# Patient Record
Sex: FEMALE | Race: WHITE | HISPANIC OR LATINO | URBAN - METROPOLITAN AREA
[De-identification: names, ages, dates, MRNs, and addresses within clinical notes are randomized per-mention and may not be internally consistent; named-entity substitution may affect disease eponyms.]

---

## 2017-01-07 ENCOUNTER — EMERGENCY (EMERGENCY)
Facility: HOSPITAL | Age: 27
LOS: 0 days | Discharge: HOME | End: 2017-01-07
Admitting: EMERGENCY MEDICINE
Payer: SELF-PAY

## 2017-01-07 DIAGNOSIS — T19.2XXA FOREIGN BODY IN VULVA AND VAGINA, INITIAL ENCOUNTER: ICD-10-CM
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
## 2017-01-07 PROCEDURE — 99283 EMERGENCY DEPT VISIT LOW MDM: CPT

## 2018-01-01 ENCOUNTER — OUTPATIENT (OUTPATIENT)
Dept: OUTPATIENT SERVICES | Facility: HOSPITAL | Age: 28
LOS: 1 days | End: 2018-01-01
Payer: MEDICAID

## 2018-01-01 PROCEDURE — G9001: CPT

## 2018-01-16 ENCOUNTER — EMERGENCY (EMERGENCY)
Facility: HOSPITAL | Age: 28
LOS: 0 days | Discharge: HOME | End: 2018-01-16
Attending: EMERGENCY MEDICINE | Admitting: EMERGENCY MEDICINE
Payer: MEDICAID

## 2018-01-16 VITALS
OXYGEN SATURATION: 100 % | HEART RATE: 81 BPM | TEMPERATURE: 98 F | DIASTOLIC BLOOD PRESSURE: 70 MMHG | SYSTOLIC BLOOD PRESSURE: 116 MMHG | RESPIRATION RATE: 19 BRPM

## 2018-01-16 VITALS — HEIGHT: 61 IN | WEIGHT: 145.06 LBS

## 2018-01-16 DIAGNOSIS — R07.0 PAIN IN THROAT: ICD-10-CM

## 2018-01-16 DIAGNOSIS — R09.81 NASAL CONGESTION: ICD-10-CM

## 2018-01-16 DIAGNOSIS — R05 COUGH: ICD-10-CM

## 2018-01-16 DIAGNOSIS — J06.9 ACUTE UPPER RESPIRATORY INFECTION, UNSPECIFIED: ICD-10-CM

## 2018-01-16 PROCEDURE — 99283 EMERGENCY DEPT VISIT LOW MDM: CPT

## 2018-01-16 RX ORDER — DEXAMETHASONE 0.5 MG/5ML
8 ELIXIR ORAL ONCE
Qty: 0 | Refills: 0 | Status: DISCONTINUED | OUTPATIENT
Start: 2018-01-16 | End: 2018-01-16

## 2018-01-16 RX ORDER — IBUPROFEN 200 MG
600 TABLET ORAL ONCE
Qty: 0 | Refills: 0 | Status: COMPLETED | OUTPATIENT
Start: 2018-01-16 | End: 2018-01-16

## 2018-01-16 RX ORDER — DEXAMETHASONE 0.5 MG/5ML
10 ELIXIR ORAL ONCE
Qty: 0 | Refills: 0 | Status: COMPLETED | OUTPATIENT
Start: 2018-01-16 | End: 2018-01-16

## 2018-01-16 RX ADMIN — Medication 600 MILLIGRAM(S): at 17:27

## 2018-01-16 RX ADMIN — Medication 10 MILLIGRAM(S): at 17:27

## 2018-01-16 NOTE — ED STATDOCS - OBJECTIVE STATEMENT
26 y/o F w/ pshx of tonsillectomy presents to ED c/o nasal congestion x4 days ago w/ dry cough at night +chills, +sweats, +muscle aches. Pt c/o swollen glands on her neck and nasal congestion and some difficulty breathing.

## 2018-01-16 NOTE — ED ADULT NURSE NOTE - OBJECTIVE STATEMENT
pt presents to ED with dry cough and nasal congestion x few days. pt c.o chills. afebrile. denies n/v/d. will continue to monitor and reassess

## 2018-01-16 NOTE — ED PROVIDER NOTE - ASSOCIATED SYMPTOMS
26 y/o Female reports to ED c/o nasal congestion x 4 days, dry cough for 2 days, worse at night.  Felt chills and sweats few days ago.  did not take temperature.

## 2018-01-16 NOTE — ED STATDOCS - NS_ ATTENDINGSCRIBEDETAILS _ED_A_ED_FT
Everette Doran DO (Attending): The history, relevant review of systems, past medical and surgical history, medical decision making, and physical examination was documented by the scribe in my presence and I attest to the accuracy of the documentation.

## 2018-01-16 NOTE — ED STATDOCS - ATTENDING CONTRIBUTION TO CARE
I, Everette Doran DO,  performed the initial face to face bedside interview with this patient regarding history of present illness, review of symptoms and relevant past medical, social and family history.  I completed an independent physical examination.  I was the initial provider who evaluated this patient. I have signed out the follow up of any pending tests (i.e. labs, radiological studies) to the ACP.  I have communicated the patient’s plan of care and disposition with the ACP.

## 2018-01-16 NOTE — ED STATDOCS - ENMT, MLM
nasal congestion, post-nasal drip. B/l TMs w/ effusion, no erythema. Mouth with normal mucosa  Throat has no vesicles, no oropharyngeal exudates and uvula is midline.

## 2018-01-18 DIAGNOSIS — R69 ILLNESS, UNSPECIFIED: ICD-10-CM

## 2018-05-04 ENCOUNTER — APPOINTMENT (OUTPATIENT)
Dept: OPHTHALMOLOGY | Facility: CLINIC | Age: 28
End: 2018-05-04
Payer: MEDICAID

## 2018-05-04 PROCEDURE — 99204 OFFICE O/P NEW MOD 45 MIN: CPT

## 2018-05-17 ENCOUNTER — APPOINTMENT (OUTPATIENT)
Dept: OPHTHALMOLOGY | Facility: CLINIC | Age: 28
End: 2018-05-17

## 2018-05-24 LAB
BACTERIA EYE AEROBE CULT: NORMAL
VIRAL CULTURE, GENERAL: NORMAL

## 2018-06-07 ENCOUNTER — EMERGENCY (EMERGENCY)
Facility: HOSPITAL | Age: 28
LOS: 0 days | Discharge: HOME | End: 2018-06-08
Attending: EMERGENCY MEDICINE | Admitting: EMERGENCY MEDICINE
Payer: MEDICAID

## 2018-06-07 VITALS
RESPIRATION RATE: 18 BRPM | HEART RATE: 75 BPM | WEIGHT: 139.99 LBS | HEIGHT: 61 IN | DIASTOLIC BLOOD PRESSURE: 77 MMHG | OXYGEN SATURATION: 100 % | TEMPERATURE: 98 F | SYSTOLIC BLOOD PRESSURE: 117 MMHG

## 2018-06-07 DIAGNOSIS — S93.602A UNSPECIFIED SPRAIN OF LEFT FOOT, INITIAL ENCOUNTER: ICD-10-CM
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
## 2018-06-07 DIAGNOSIS — Z91.040 LATEX ALLERGY STATUS: ICD-10-CM

## 2018-06-07 DIAGNOSIS — M79.672 PAIN IN LEFT FOOT: ICD-10-CM

## 2018-06-07 PROCEDURE — 99283 EMERGENCY DEPT VISIT LOW MDM: CPT | Mod: 25

## 2018-06-07 NOTE — ED ADULT TRIAGE NOTE - CHIEF COMPLAINT QUOTE
left big toe pain s/p fall last week. Pt tripped and fell wearing heels and injured left big toe. Worsening pain today. No medication taken at home for pain.

## 2018-06-08 PROCEDURE — 73630 X-RAY EXAM OF FOOT: CPT | Mod: 26,LT

## 2018-06-08 RX ORDER — IBUPROFEN 200 MG
800 TABLET ORAL ONCE
Qty: 0 | Refills: 0 | Status: COMPLETED | OUTPATIENT
Start: 2018-06-08 | End: 2018-06-08

## 2018-06-08 RX ADMIN — Medication 800 MILLIGRAM(S): at 02:07

## 2018-06-08 NOTE — ED PROVIDER NOTE - OBJECTIVE STATEMENT
29 y/o otherwise healthy female p/w right foot 27 y/o otherwise healthy female p/w left medial foot pain worse at the 1st toe over the past 3 weeks.  She initially inverted her foot while wearing heals 3 weeks ago.  She has been wearing flat shoes but still having pain.  She had a massage during a pedicure today and felt a "pop" at her 1st MTP joint.  She hasn't taken any meds for pain.

## 2018-06-12 LAB — FUNGUS SPEC CULT ORG #8: NORMAL

## 2018-06-26 ENCOUNTER — APPOINTMENT (OUTPATIENT)
Dept: OPHTHALMOLOGY | Facility: CLINIC | Age: 28
End: 2018-06-26
Payer: MEDICAID

## 2018-07-05 ENCOUNTER — APPOINTMENT (OUTPATIENT)
Dept: OPHTHALMOLOGY | Facility: CLINIC | Age: 28
End: 2018-07-05
Payer: MEDICAID

## 2018-07-05 PROCEDURE — 92012 INTRM OPH EXAM EST PATIENT: CPT

## 2018-07-12 ENCOUNTER — APPOINTMENT (OUTPATIENT)
Dept: OPHTHALMOLOGY | Facility: CLINIC | Age: 28
End: 2018-07-12
Payer: MEDICAID

## 2018-07-12 DIAGNOSIS — H10.32 UNSPECIFIED ACUTE CONJUNCTIVITIS, LEFT EYE: ICD-10-CM

## 2018-07-12 PROCEDURE — 92012 INTRM OPH EXAM EST PATIENT: CPT

## 2018-07-19 ENCOUNTER — APPOINTMENT (OUTPATIENT)
Dept: OPHTHALMOLOGY | Facility: CLINIC | Age: 28
End: 2018-07-19

## 2018-07-19 DIAGNOSIS — H10.412: ICD-10-CM

## 2021-02-19 ENCOUNTER — EMERGENCY (EMERGENCY)
Facility: HOSPITAL | Age: 31
LOS: 0 days | Discharge: HOME | End: 2021-02-19
Attending: EMERGENCY MEDICINE
Payer: MEDICAID

## 2021-02-19 VITALS
HEART RATE: 82 BPM | DIASTOLIC BLOOD PRESSURE: 84 MMHG | RESPIRATION RATE: 13 BRPM | TEMPERATURE: 99 F | SYSTOLIC BLOOD PRESSURE: 127 MMHG | OXYGEN SATURATION: 100 %

## 2021-02-19 VITALS — WEIGHT: 154.98 LBS | HEIGHT: 61 IN

## 2021-02-19 DIAGNOSIS — M54.31 SCIATICA, RIGHT SIDE: ICD-10-CM

## 2021-02-19 DIAGNOSIS — M54.9 DORSALGIA, UNSPECIFIED: ICD-10-CM

## 2021-02-19 DIAGNOSIS — Z91.040 LATEX ALLERGY STATUS: ICD-10-CM

## 2021-02-19 PROCEDURE — 99283 EMERGENCY DEPT VISIT LOW MDM: CPT | Mod: 25

## 2021-02-19 PROCEDURE — 81025 URINE PREGNANCY TEST: CPT

## 2021-02-19 PROCEDURE — 96372 THER/PROPH/DIAG INJ SC/IM: CPT

## 2021-02-19 PROCEDURE — 99283 EMERGENCY DEPT VISIT LOW MDM: CPT

## 2021-02-19 RX ORDER — KETOROLAC TROMETHAMINE 30 MG/ML
30 SYRINGE (ML) INJECTION ONCE
Refills: 0 | Status: DISCONTINUED | OUTPATIENT
Start: 2021-02-19 | End: 2021-02-19

## 2021-02-19 RX ORDER — IBUPROFEN 200 MG
1 TABLET ORAL
Qty: 20 | Refills: 0
Start: 2021-02-19 | End: 2021-02-23

## 2021-02-19 RX ORDER — CYCLOBENZAPRINE HYDROCHLORIDE 10 MG/1
1 TABLET, FILM COATED ORAL
Qty: 15 | Refills: 0
Start: 2021-02-19 | End: 2021-02-23

## 2021-02-19 RX ADMIN — Medication 30 MILLIGRAM(S): at 20:33

## 2021-02-19 NOTE — ED STATDOCS - NS_EDPROVIDERDISPOUSERTYPE_ED_A_ED
----- Message from Maria G Gonzalez sent at 1/24/2019  1:02 PM CST -----  Contact: Patient  Type:  Sooner Apoointment Request    Caller is requesting a sooner appointment.  Caller declined first available appointment listed below.  Caller will not accept being placed on the waitlist and is requesting a message be sent to doctor.    Name of Caller:  Patient  When is the first available appointment?  Patient is trying to schedule an appt on 2/13-2/15 because she will be in town then  Symptoms:  Medication check  Best Call Back Number:    Additional Information:  Patient would also like to find out if she needs labs done before her appt.       Scribe Attestation (For Scribes USE Only)... Attending Attestation (For Attendings USE Only).../Scribe Attestation (For Scribes USE Only)...

## 2021-02-19 NOTE — ED STATDOCS - PATIENT PORTAL LINK FT
You can access the FollowMyHealth Patient Portal offered by [facility] by registering at the following website: [email or web address]. By joining [employer]’s FollowMyHealth portal, you will also be able to view your health information using other applications (apps) compatible with our system.

## 2021-02-19 NOTE — ED STATDOCS - CARE PROVIDER_API CALL
Ulisses Jefferson (DO)  Orthopaedic Surgery  [address] [place] [address], [address] [place] [address]  Phone: [phone]  Fax: [phone]  Follow Up Time:

## 2021-02-19 NOTE — ED ADULT TRIAGE NOTE - CHIEF COMPLAINT QUOTE
presents to the ed complaining of lower back pan radiating down the R side of her leg, onset began when patient woke up 2 weeks ago.

## 2021-02-19 NOTE — ED STATDOCS - NSFOLLOWUPINSTRUCTIONS_ED_ALL_ED_FT
Sciatica       Sciatica is pain, weakness, tingling, or loss of feeling (numbness) along the sciatic nerve. The sciatic nerve starts in the lower back and goes down the back of each leg. Sciatica usually goes away on its own or with treatment. Sometimes, sciatica may come back (recur).      What are the causes?  This condition happens when the sciatic nerve is pinched or has pressure put on it. This may be the result of:  •A disk in between the bones of the spine bulging out too far (herniated disk).      •Changes in the spinal disks that occur with aging.      •A condition that affects a muscle in the butt.      •Extra bone growth near the sciatic nerve.      •A break (fracture) of the area between your hip bones (pelvis).      •Pregnancy.       •Tumor. This is rare.        What increases the risk?  You are more likely to develop this condition if you:  •Play sports that put pressure or stress on the spine.      •Have poor strength and ease of movement (flexibility).      •Have had a back injury in the past.      •Have had back surgery.      •Sit for long periods of time.      •Do activities that involve bending or lifting over and over again.      •Are very overweight (obese).        What are the signs or symptoms?  Symptoms can vary from mild to very bad. They may include:•Any of these problems in the lower back, leg, hip, or butt:  •Mild tingling, loss of feeling, or dull aches.      •Burning sensations.      •Sharp pains.         •Loss of feeling in the back of the calf or the sole of the foot.      •Leg weakness.       •Very bad back pain that makes it hard to move.      These symptoms may get worse when you cough, sneeze, or laugh. They may also get worse when you sit or stand for long periods of time.      How is this treated?  This condition often gets better without any treatment. However, treatment may include:  •Changing or cutting back on physical activity when you have pain.      •Doing exercises and stretching.      •Putting ice or heat on the affected area.    •Medicines that help:   •To relieve pain and swelling.       •To relax your muscles.         •Shots (injections) of medicines that help to relieve pain, irritation, and swelling.      •Surgery.        Follow these instructions at home:    Medicines     •Take over-the-counter and prescription medicines only as told by your doctor.    •Ask your doctor if the medicine prescribed to you:  •Requires you to avoid driving or using heavy machinery.    •Can cause trouble pooping (constipation). You may need to take these steps to prevent or treat trouble pooping:  •Drink enough fluids to keep your pee (urine) pale yellow.      •Take over-the-counter or prescription medicines.      •Eat foods that are high in fiber. These include beans, whole grains, and fresh fruits and vegetables.      •Limit foods that are high in fat and sugar. These include fried or sweet foods.            Managing pain                 •If told, put ice on the affected area.  •Put ice in a plastic bag.      •Place a towel between your skin and the bag.      •Leave the ice on for 20 minutes, 2–3 times a day.      •If told, put heat on the affected area. Use the heat source that your doctor tells you to use, such as a moist heat pack or a heating pad.  •Place a towel between your skin and the heat source.      •Leave the heat on for 20–30 minutes.      •Remove the heat if your skin turns bright red. This is very important if you are unable to feel pain, heat, or cold. You may have a greater risk of getting burned.          Activity      •Return to your normal activities as told by your doctor. Ask your doctor what activities are safe for you.      •Avoid activities that make your symptoms worse.    •Take short rests during the day.  •When you rest for a long time, do some physical activity or stretching between periods of rest.      •Avoid sitting for a long time without moving. Get up and move around at least one time each hour.        •Exercise and stretch regularly, as told by your doctor.    • Do not lift anything that is heavier than 10 lb (4.5 kg) while you have symptoms of sciatica.  •Avoid lifting heavy things even when you do not have symptoms.      •Avoid lifting heavy things over and over.      •When you lift objects, always lift in a way that is safe for your body. To do this, you should:  •Bend your knees.      •Keep the object close to your body.      •Avoid twisting.        General instructions     •Stay at a healthy weight.      •Wear comfortable shoes that support your feet. Avoid wearing high heels.      •Avoid sleeping on a mattress that is too soft or too hard. You might have less pain if you sleep on a mattress that is firm enough to support your back.      •Keep all follow-up visits as told by your doctor. This is important.        Contact a doctor if:  •You have pain that:  •Wakes you up when you are sleeping.      •Gets worse when you lie down.      •Is worse than the pain you have had in the past.      •Lasts longer than 4 weeks.        •You lose weight without trying.        Get help right away if:    •You cannot control when you pee (urinate) or poop (have a bowel movement).    •You have weakness in any of these areas and it gets worse:  •Lower back.      •The area between your hip bones.      •Butt.      •Legs.        •You have redness or swelling of your back.      •You have a burning feeling when you pee.        Summary    •Sciatica is pain, weakness, tingling, or loss of feeling (numbness) along the sciatic nerve.      •This condition happens when the sciatic nerve is pinched or has pressure put on it.      •Sciatica can cause pain, tingling, or loss of feeling (numbness) in the lower back, legs, hips, and butt.      •Treatment often includes rest, exercise, medicines, and putting ice or heat on the affected area.      This information is not intended to replace advice given to you by your health care provider. Make sure you discuss any questions you have with your health care provider.      Document Revised: 01/06/2020 Document Reviewed: 01/06/2020    Elsevier Patient Education © 2021 [employer]

## 2021-02-19 NOTE — ED STATDOCS - OBJECTIVE STATEMENT
31 y/o female with no pertinent PMHx presents to the ED c/o back pain x 2.5 weeks. Pt states that pain began after cleaning home, worsening over past 4-5 days. Denies any precipitating trauma or injury. Pt describes pain as burning and radiates down to RLE. Denies bladder or bowel incontinence, dysuria, hematuria, fevers, chills, headache, chest pain, abd pain, SOB. No other complaints at this time.

## 2021-02-19 NOTE — ED STATDOCS - CLINICAL SUMMARY MEDICAL DECISION MAKING FREE TEXT BOX
Pt with sciatica.  No red flags, able to ambulate well, no neuro deficit.  D/c home with supportive care, f/u with spine.

## 2021-02-19 NOTE — ED STATDOCS - PROGRESS NOTE DETAILS
31 y/o female with no pertinent PMHx presents to the ED c/o back pain x 2.5 weeks. Pt states that pain began after cleaning home, worsening over past 4-5 days. Denies any precipitating trauma or injury. Pt describes pain as burning and radiates down to RLE. Denies bladder or bowel incontinence.  UCG pending.  Then pain meds will be given, Toradol and Flexeril.  Plan to dc home with Ortho Spine f/u.  Brittany Sotomayor PA-C

## 2021-03-04 ENCOUNTER — APPOINTMENT (OUTPATIENT)
Dept: NEUROSURGERY | Facility: CLINIC | Age: 31
End: 2021-03-04
Payer: MEDICAID

## 2021-03-04 ENCOUNTER — APPOINTMENT (OUTPATIENT)
Dept: RADIOLOGY | Facility: CLINIC | Age: 31
End: 2021-03-04
Payer: MEDICAID

## 2021-03-04 ENCOUNTER — OUTPATIENT (OUTPATIENT)
Dept: OUTPATIENT SERVICES | Facility: HOSPITAL | Age: 31
LOS: 1 days | End: 2021-03-04
Payer: MEDICAID

## 2021-03-04 VITALS
BODY MASS INDEX: 28.32 KG/M2 | WEIGHT: 150 LBS | TEMPERATURE: 97.7 F | SYSTOLIC BLOOD PRESSURE: 122 MMHG | DIASTOLIC BLOOD PRESSURE: 81 MMHG | HEART RATE: 98 BPM | HEIGHT: 61 IN | OXYGEN SATURATION: 98 %

## 2021-03-04 DIAGNOSIS — M54.40 LUMBAGO WITH SCIATICA, UNSPECIFIED SIDE: ICD-10-CM

## 2021-03-04 DIAGNOSIS — Z78.9 OTHER SPECIFIED HEALTH STATUS: ICD-10-CM

## 2021-03-04 PROCEDURE — 72110 X-RAY EXAM L-2 SPINE 4/>VWS: CPT | Mod: 26

## 2021-03-04 PROCEDURE — 99203 OFFICE O/P NEW LOW 30 MIN: CPT

## 2021-03-04 PROCEDURE — 99072 ADDL SUPL MATRL&STAF TM PHE: CPT

## 2021-03-04 PROCEDURE — 72110 X-RAY EXAM L-2 SPINE 4/>VWS: CPT

## 2021-03-04 RX ORDER — OFLOXACIN 3 MG/ML
0.3 SOLUTION/ DROPS OPHTHALMIC 4 TIMES DAILY
Qty: 5 | Refills: 1 | Status: DISCONTINUED | COMMUNITY
Start: 2018-07-12 | End: 2021-03-04

## 2021-03-04 RX ORDER — PREDNISOLONE ACETATE 10 MG/ML
1 SUSPENSION/ DROPS OPHTHALMIC 3 TIMES DAILY
Qty: 1 | Refills: 5 | Status: DISCONTINUED | COMMUNITY
Start: 2018-05-04 | End: 2021-03-04

## 2021-03-04 RX ORDER — OLOPATADINE HCL 1 MG/ML
0.1 SOLUTION/ DROPS OPHTHALMIC TWICE DAILY
Qty: 1 | Refills: 3 | Status: DISCONTINUED | COMMUNITY
Start: 2018-05-04 | End: 2021-03-04

## 2021-03-04 RX ORDER — METHYLPREDNISOLONE 4 MG/1
4 TABLET ORAL
Qty: 1 | Refills: 1 | Status: ACTIVE | COMMUNITY
Start: 2021-03-04 | End: 1900-01-01

## 2021-03-04 RX ORDER — LOTEPREDNOL ETABONATE AND TOBRAMYCIN 5; 3 MG/ML; MG/ML
0.5-0.3 SUSPENSION/ DROPS OPHTHALMIC 4 TIMES DAILY
Qty: 1 | Refills: 1 | Status: DISCONTINUED | COMMUNITY
Start: 2018-07-05 | End: 2021-03-04

## 2021-03-04 RX ORDER — ACETAMINOPHEN 325 MG/1
TABLET, FILM COATED ORAL
Refills: 0 | Status: ACTIVE | COMMUNITY

## 2021-03-04 RX ORDER — FLUOROMETHOLONE 1 MG/ML
0.1 SOLUTION/ DROPS OPHTHALMIC 3 TIMES DAILY
Qty: 5 | Refills: 1 | Status: DISCONTINUED | COMMUNITY
Start: 2018-07-12 | End: 2021-03-04

## 2021-03-04 RX ORDER — IBUPROFEN 200 MG/1
TABLET, COATED ORAL
Refills: 0 | Status: ACTIVE | COMMUNITY

## 2021-03-05 NOTE — CONSULT LETTER
[Dear  ___] : Dear  [unfilled], [Courtesy Letter:] : I had the pleasure of seeing your patient, [unfilled], in my office today. [Sincerely,] : Sincerely, [FreeTextEntry2] : Kasie Adams MD\par [address]\[place] [address] [FreeTextEntry1] : Anant Rudolph is a 30-year-old female who presents today with lumbar symptoms.  Patient reports chronic history of lower back pain dating about 9 years ago after giving birth.  Patient reports most recent flareup started 5 weeks ago without any specific event however has worsened within the past 2 weeks.  Patient reports 10 out of 10 sharp lower back pain with shooting burning pain radiating into buttocks, right side greater than left.  She denies any leg weakness, numbness, or tingling.  She denies any bowel or bladder dysfunction or saddle anesthesia.  She denies any tripping over her feet.  She reports going up stairs is difficult.\par \par Patient has been under the care of her primary care physician since August 2020 for her lower back symptoms.  At that time she had underwent 6 weeks of physical therapy with temporary relief.  Patient reports heating pads provide her temporary relief.  Icy Hot provides temporary relief.  Patient states she was seen at [facility] emergency room on February 19, 2021. patient was prescribed a muscle relaxant which has helped her sleep at night.  Patient is unable to take Advil and Tylenol due to GI side effects.\par \par Patient is alert and oriented.  No distress noted.  Strength to bilateral lower extremities 5/5.  Reflexes to lower extremity present and equal.  Negative clonus.  Patient is walk without difficulties.\par \par I provided the patient with a prescription for an x-ray and MRI of the lumbar spine.  I have also provided the patient with a prescription for physical therapy.  I provided the patient with a prescription for Medrol Dosepak.  We will follow-up over the phone once imaging is available.  Patient is aware to call with any further questions or concerns or with any new or worsening symptoms.\par  [FreeTextEntry3] : Mercedes Bergeron, MSN, FNP-BC\par Nurse Practitioner\par Neurosurgery\par [address] [place] [address], [address]nd floor \[place] [address] \par Office: [phone] \par Fax: [phone]\par \par

## 2021-03-06 ENCOUNTER — TRANSCRIPTION ENCOUNTER (OUTPATIENT)
Age: 31
End: 2021-03-06

## 2021-03-08 RX ORDER — METHYLPREDNISOLONE 4 MG/1
4 TABLET ORAL
Qty: 1 | Refills: 1 | Status: ACTIVE | COMMUNITY
Start: 2021-03-08 | End: 1900-01-01

## 2021-03-15 ENCOUNTER — APPOINTMENT (OUTPATIENT)
Dept: MRI IMAGING | Facility: CLINIC | Age: 31
End: 2021-03-15
Payer: MEDICAID

## 2021-03-15 ENCOUNTER — OUTPATIENT (OUTPATIENT)
Dept: OUTPATIENT SERVICES | Facility: HOSPITAL | Age: 31
LOS: 1 days | End: 2021-03-15
Payer: MEDICAID

## 2021-03-15 DIAGNOSIS — M54.40 LUMBAGO WITH SCIATICA, UNSPECIFIED SIDE: ICD-10-CM

## 2021-03-15 PROCEDURE — 72148 MRI LUMBAR SPINE W/O DYE: CPT

## 2021-03-15 PROCEDURE — 72148 MRI LUMBAR SPINE W/O DYE: CPT | Mod: 26

## 2021-08-07 ENCOUNTER — EMERGENCY (EMERGENCY)
Facility: HOSPITAL | Age: 31
LOS: 0 days | Discharge: HOME | End: 2021-08-07
Attending: EMERGENCY MEDICINE
Payer: MEDICAID

## 2021-08-07 VITALS
OXYGEN SATURATION: 100 % | SYSTOLIC BLOOD PRESSURE: 134 MMHG | RESPIRATION RATE: 16 BRPM | TEMPERATURE: 98 F | HEART RATE: 57 BPM | DIASTOLIC BLOOD PRESSURE: 83 MMHG

## 2021-08-07 VITALS — HEIGHT: 61 IN | WEIGHT: 154.98 LBS

## 2021-08-07 DIAGNOSIS — K62.5 HEMORRHAGE OF ANUS AND RECTUM: ICD-10-CM

## 2021-08-07 DIAGNOSIS — Z79.899 OTHER LONG TERM (CURRENT) DRUG THERAPY: ICD-10-CM

## 2021-08-07 DIAGNOSIS — R50.9 FEVER, UNSPECIFIED: ICD-10-CM

## 2021-08-07 DIAGNOSIS — R19.7 DIARRHEA, UNSPECIFIED: ICD-10-CM

## 2021-08-07 DIAGNOSIS — R10.13 EPIGASTRIC PAIN: ICD-10-CM

## 2021-08-07 DIAGNOSIS — R63.0 ANOREXIA: ICD-10-CM

## 2021-08-07 DIAGNOSIS — M54.9 DORSALGIA, UNSPECIFIED: ICD-10-CM

## 2021-08-07 DIAGNOSIS — R11.0 NAUSEA: ICD-10-CM

## 2021-08-07 DIAGNOSIS — Z91.040 LATEX ALLERGY STATUS: ICD-10-CM

## 2021-08-07 DIAGNOSIS — Z79.2 LONG TERM (CURRENT) USE OF ANTIBIOTICS: ICD-10-CM

## 2021-08-07 LAB
ADD ON TEST-SPECIMEN IN LAB: SIGNIFICANT CHANGE UP
ADD ON TEST-SPECIMEN IN LAB: SIGNIFICANT CHANGE UP
ALBUMIN SERPL ELPH-MCNC: 4.1 G/DL — SIGNIFICANT CHANGE UP (ref 3.3–5)
ALP SERPL-CCNC: 80 U/L — SIGNIFICANT CHANGE UP (ref 40–120)
ALT FLD-CCNC: 40 U/L — SIGNIFICANT CHANGE UP (ref 12–78)
ANION GAP SERPL CALC-SCNC: 1 MMOL/L — LOW (ref 5–17)
APPEARANCE UR: CLEAR — SIGNIFICANT CHANGE UP
AST SERPL-CCNC: 26 U/L — SIGNIFICANT CHANGE UP (ref 15–37)
BILIRUB SERPL-MCNC: 0.5 MG/DL — SIGNIFICANT CHANGE UP (ref 0.2–1.2)
BILIRUB UR-MCNC: NEGATIVE — SIGNIFICANT CHANGE UP
BUN SERPL-MCNC: 10 MG/DL — SIGNIFICANT CHANGE UP (ref 7–23)
CALCIUM SERPL-MCNC: 9.1 MG/DL — SIGNIFICANT CHANGE UP (ref 8.5–10.1)
CHLORIDE SERPL-SCNC: 107 MMOL/L — SIGNIFICANT CHANGE UP (ref 96–108)
CO2 SERPL-SCNC: 28 MMOL/L — SIGNIFICANT CHANGE UP (ref 22–31)
COLOR SPEC: YELLOW — SIGNIFICANT CHANGE UP
CREAT SERPL-MCNC: 0.67 MG/DL — SIGNIFICANT CHANGE UP (ref 0.5–1.3)
DIFF PNL FLD: ABNORMAL
GLUCOSE SERPL-MCNC: 99 MG/DL — SIGNIFICANT CHANGE UP (ref 70–99)
GLUCOSE UR QL: NEGATIVE MG/DL — SIGNIFICANT CHANGE UP
HCT VFR BLD CALC: 42.7 % — SIGNIFICANT CHANGE UP (ref 34.5–45)
HGB BLD-MCNC: 14.8 G/DL — SIGNIFICANT CHANGE UP (ref 11.5–15.5)
KETONES UR-MCNC: NEGATIVE — SIGNIFICANT CHANGE UP
LEUKOCYTE ESTERASE UR-ACNC: NEGATIVE — SIGNIFICANT CHANGE UP
MCHC RBC-ENTMCNC: 31.5 PG — SIGNIFICANT CHANGE UP (ref 27–34)
MCHC RBC-ENTMCNC: 34.7 GM/DL — SIGNIFICANT CHANGE UP (ref 32–36)
MCV RBC AUTO: 90.9 FL — SIGNIFICANT CHANGE UP (ref 80–100)
NITRITE UR-MCNC: NEGATIVE — SIGNIFICANT CHANGE UP
PH UR: 5 — SIGNIFICANT CHANGE UP (ref 5–8)
PLATELET # BLD AUTO: 255 K/UL — SIGNIFICANT CHANGE UP (ref 150–400)
POTASSIUM SERPL-MCNC: 4.2 MMOL/L — SIGNIFICANT CHANGE UP (ref 3.5–5.3)
POTASSIUM SERPL-SCNC: 4.2 MMOL/L — SIGNIFICANT CHANGE UP (ref 3.5–5.3)
PROT SERPL-MCNC: 7.5 GM/DL — SIGNIFICANT CHANGE UP (ref 6–8.3)
PROT UR-MCNC: NEGATIVE MG/DL — SIGNIFICANT CHANGE UP
RBC # BLD: 4.7 M/UL — SIGNIFICANT CHANGE UP (ref 3.8–5.2)
RBC # FLD: 11.7 % — SIGNIFICANT CHANGE UP (ref 10.3–14.5)
SODIUM SERPL-SCNC: 136 MMOL/L — SIGNIFICANT CHANGE UP (ref 135–145)
SP GR SPEC: 1.02 — SIGNIFICANT CHANGE UP (ref 1.01–1.02)
UROBILINOGEN FLD QL: NEGATIVE MG/DL — SIGNIFICANT CHANGE UP
WBC # BLD: 7.43 K/UL — SIGNIFICANT CHANGE UP (ref 3.8–10.5)
WBC # FLD AUTO: 7.43 K/UL — SIGNIFICANT CHANGE UP (ref 3.8–10.5)

## 2021-08-07 PROCEDURE — 74177 CT ABD & PELVIS W/CONTRAST: CPT | Mod: 26,ME

## 2021-08-07 PROCEDURE — 99285 EMERGENCY DEPT VISIT HI MDM: CPT

## 2021-08-07 PROCEDURE — 81001 URINALYSIS AUTO W/SCOPE: CPT

## 2021-08-07 PROCEDURE — 84702 CHORIONIC GONADOTROPIN TEST: CPT

## 2021-08-07 PROCEDURE — 96375 TX/PRO/DX INJ NEW DRUG ADDON: CPT

## 2021-08-07 PROCEDURE — 99284 EMERGENCY DEPT VISIT MOD MDM: CPT | Mod: 25

## 2021-08-07 PROCEDURE — 36415 COLL VENOUS BLD VENIPUNCTURE: CPT

## 2021-08-07 PROCEDURE — 83690 ASSAY OF LIPASE: CPT

## 2021-08-07 PROCEDURE — G1004: CPT

## 2021-08-07 PROCEDURE — 80053 COMPREHEN METABOLIC PANEL: CPT

## 2021-08-07 PROCEDURE — 85027 COMPLETE CBC AUTOMATED: CPT

## 2021-08-07 PROCEDURE — 74177 CT ABD & PELVIS W/CONTRAST: CPT

## 2021-08-07 PROCEDURE — 96374 THER/PROPH/DIAG INJ IV PUSH: CPT

## 2021-08-07 RX ORDER — MORPHINE SULFATE 50 MG/1
2 CAPSULE, EXTENDED RELEASE ORAL ONCE
Refills: 0 | Status: DISCONTINUED | OUTPATIENT
Start: 2021-08-07 | End: 2021-08-07

## 2021-08-07 RX ORDER — ONDANSETRON 8 MG/1
4 TABLET, FILM COATED ORAL ONCE
Refills: 0 | Status: COMPLETED | OUTPATIENT
Start: 2021-08-07 | End: 2021-08-07

## 2021-08-07 RX ORDER — SODIUM CHLORIDE 9 MG/ML
1000 INJECTION INTRAMUSCULAR; INTRAVENOUS; SUBCUTANEOUS ONCE
Refills: 0 | Status: COMPLETED | OUTPATIENT
Start: 2021-08-07 | End: 2021-08-07

## 2021-08-07 RX ORDER — FAMOTIDINE 10 MG/ML
20 INJECTION INTRAVENOUS ONCE
Refills: 0 | Status: COMPLETED | OUTPATIENT
Start: 2021-08-07 | End: 2021-08-07

## 2021-08-07 RX ADMIN — ONDANSETRON 4 MILLIGRAM(S): 8 TABLET, FILM COATED ORAL at 13:56

## 2021-08-07 RX ADMIN — SODIUM CHLORIDE 1000 MILLILITER(S): 9 INJECTION INTRAMUSCULAR; INTRAVENOUS; SUBCUTANEOUS at 13:04

## 2021-08-07 RX ADMIN — FAMOTIDINE 20 MILLIGRAM(S): 10 INJECTION INTRAVENOUS at 13:03

## 2021-08-07 RX ADMIN — MORPHINE SULFATE 2 MILLIGRAM(S): 50 CAPSULE, EXTENDED RELEASE ORAL at 13:56

## 2021-08-07 NOTE — ED PROVIDER NOTE - CLINICAL SUMMARY MEDICAL DECISION MAKING FREE TEXT BOX
32 y/o female with no pertinent PMHx  presents to the ED c/o cramping upper abd. constant pain with associated mult. episodes loose BMs since last night.  This AM, 3 episodes loose BMs with blood.  + Epigastric tender, mild clinical Greer's, slight R CVAT.    Plan: labs incl. stool studies, urine, preg. test, IVF, IV Pepcid/morphine/Zofran, CT A/P; observe, reassess. 30 y/o female with no pertinent PMHx  presents to the ED c/o cramping upper abd. constant pain with associated mult. episodes loose BMs since last night.  This AM, 3 episodes loose BMs with blood.  + Epigastric tender, mild clinical Greer's, slight R CVAT.    Plan: labs incl., urine, preg. test, IVF, IV Pepcid/morphine/Zofran, CT A/P; observe, reassess.  Pt w/o a BM while in ED.

## 2021-08-07 NOTE — ED PROVIDER NOTE - CPE EDP SKIN NORM
[Well Groomed] : well groomed [General Appearance - In No Acute Distress] : no acute distress [Normal Conjunctiva] : the conjunctiva exhibited no abnormalities [Eyelids - No Xanthelasma] : the eyelids demonstrated no xanthelasmas [Normal Oral Mucosa] : normal oral mucosa [No Oral Pallor] : no oral pallor [No Oral Cyanosis] : no oral cyanosis [Normal Jugular Venous A Waves Present] : normal jugular venous A waves present [Normal Jugular Venous V Waves Present] : normal jugular venous V waves present [No Jugular Venous Vincent A Waves] : no jugular venous vincent A waves normal... [Respiration, Rhythm And Depth] : normal respiratory rhythm and effort [Exaggerated Use Of Accessory Muscles For Inspiration] : no accessory muscle use [Auscultation Breath Sounds / Voice Sounds] : lungs were clear to auscultation bilaterally [Abdomen Soft] : soft [Abdomen Tenderness] : non-tender [Abdomen Mass (___ Cm)] : no abdominal mass palpated [Abnormal Walk] : normal gait [Gait - Sufficient For Exercise Testing] : the gait was sufficient for exercise testing [Nail Clubbing] : no clubbing of the fingernails [Cyanosis, Localized] : no localized cyanosis [Petechial Hemorrhages (___cm)] : no petechial hemorrhages [Skin Color & Pigmentation] : normal skin color and pigmentation [] : no rash [No Venous Stasis] : no venous stasis [Skin Lesions] : no skin lesions [No Skin Ulcers] : no skin ulcer [No Xanthoma] : no  xanthoma was observed [Oriented To Time, Place, And Person] : oriented to person, place, and time [Affect] : the affect was normal [Mood] : the mood was normal [No Anxiety] : not feeling anxious [Normal Rate] : normal [Rhythm Regular] : regular [Normal S1] : normal S1 [Normal S2] : normal S2 [No Gallop] : no gallop heard [II] : a grade 2 [I] : a grade 1 [2+] : left 2+ [___ +] : bilateral [unfilled]U+ pretibial pitting edema [Right Carotid Bruit] : no bruit heard over the right carotid [Left Carotid Bruit] : no bruit heard over the left carotid

## 2021-08-07 NOTE — ED PROVIDER NOTE - PROGRESS NOTE DETAILS
JOSE LUIS Massey MD:  Labs, CT, urine negative.  Pt reports + symptomatic response to meds/IVF, feels much better.  pt re-examined: minimal epigastric tender, no G/R/mass.  Results of studies d/w pt, who expressed her understanding & agrees with D/C home, outpt PCP or local Urgi-Center f/u next 2 - 3 days.

## 2021-08-07 NOTE — ED ADULT TRIAGE NOTE - CHIEF COMPLAINT QUOTE
Pt. to the ED C/O Severe Epigastric Pain with Diarrhea several times , Nausea and Rectal Bleeding x 3 times this morning- Denies major medical hx

## 2021-08-07 NOTE — ED PROVIDER NOTE - CONSTITUTIONAL, MLM
White female in mild discomfort due to pain, no respiratory distress, non-toxic appearing, awake, alert, oriented to person, place, time/situation and in no apparent distress. normal...

## 2021-08-07 NOTE — ED PROVIDER NOTE - MUSCULOSKELETAL, MLM
Spine appears normal, range of motion is not limited. Right CVA tenderness. mild tenderness to  b/l lower para lumbar area, no assoc. swelling.

## 2021-08-07 NOTE — ED PROVIDER NOTE - GASTROINTESTINAL, MLM
Abdomen soft, no guarding. diffuse abd TTP worse in epigastric region , +clinical Greer's sign, Obturator' s negative. Abdomen soft, no guarding. diffuse abd TTP worse in epigastric region , +mild clinical Greer's sign, Obturator' s negative.

## 2021-08-07 NOTE — ED ADULT NURSE NOTE - OBJECTIVE STATEMENT
epigastric pain radiaitng to her lower back since last night with multiple episodes of loose stools with transition to diarrhea this am, blood in stool this am x 3.  Denies fever/chills, CP or SOB.  No GI hx or abdominal surgeries or recent travel.  No sick contact.  Patient has an IUD, spots one day a month.  + nausea.  STates pain is constant

## 2021-08-07 NOTE — ED PROVIDER NOTE - NSFOLLOWUPINSTRUCTIONS_ED_ALL_ED_FT
Drink plenty of oral fluids.  Tylenol or Motrin 2 tabs every 6 hours as needed for pain.  Avoid alcohol, spicy/fried/greasy foods, dairy products.  Follow up next 2 - 3 days with your own doctor or local Urgi[facility]Center.  Return to ED if fever, worsening pain, persistent vomiting, large blood in diarrhea, or other concern.        Abdominal Pain    WHAT YOU NEED TO KNOW:    Abdominal pain can be dull, achy, or sharp. You may have pain in one area of your abdomen, or in your entire abdomen. Your pain may be caused by a condition such as constipation, food sensitivity or poisoning, infection, or a blockage. Abdominal pain can also be from a hernia, appendicitis, or an ulcer. Liver, gallbladder, or kidney conditions can also cause abdominal pain. The cause of your abdominal pain may not be known.    Abdominal Organs         DISCHARGE INSTRUCTIONS:    Call your local emergency number (911 in the ) if:   •You have new chest pain or shortness of breath.          Return to the emergency department if:   •You have pulsing pain in your upper abdomen or lower back that suddenly becomes constant.      •Your pain is in the right lower abdominal area and worsens with movement.      •You have a fever over 100.4°F (38°C) or shaking chills.      •You are vomiting and cannot keep food or liquids down.      •Your pain does not improve or gets worse over the next 8 to 12 hours.      •You see blood in your vomit or bowel movements, or they look black and tarry.      •Your skin or the whites of your eyes turn yellow.      •You are a woman and have a large amount of vaginal bleeding that is not your monthly period.      Call your doctor if:   •You have pain in your lower back.      •You are a man and have pain in your testicles.      •You have pain when you urinate.      •You have questions or concerns about your condition or care.      Medicines:   •Prescription pain medicine may be given. Ask your healthcare provider how to take this medicine safely. Some prescription pain medicines contain acetaminophen. Do not take other medicines that contain acetaminophen without talking to your healthcare provider. Too much acetaminophen may cause liver damage. Prescription pain medicine may cause constipation. Ask your healthcare provider how to prevent or treat constipation.       •Medicines may be given to calm your stomach or prevent vomiting.      •Take your medicine as directed. Contact your healthcare provider if you think your medicine is not helping or if you have side effects. Tell him of her if you are allergic to any medicine. Keep a list of the medicines, vitamins, and herbs you take. Include the amounts, and when and why you take them. Bring the list or the pill bottles to follow-up visits. Carry your medicine list with you in case of an emergency.      Manage your symptoms:   •Apply heat on your abdomen for 20 to 30 minutes every 2 hours for as many days as directed. Heat helps decrease pain and muscle spasms.      •Make changes to the food you eat, if needed. Do not eat foods that cause abdominal pain or other symptoms. Eat small meals more often. The following changes may also help:?Eat more high-fiber foods if you are constipated. High-fiber foods include fruits, vegetables, whole-grain foods, and legumes.             ?Do not eat foods that cause gas if you have bloating. Examples include broccoli, cabbage, and cauliflower. Do not drink soda or carbonated drinks. These may also cause gas.      ?Do not eat foods or drinks that contain sorbitol or fructose if you have diarrhea and bloating. Some examples are fruit juices, candy, jelly, and sugar-free gum.      ?Do not eat high-fat foods. Examples include fried foods, cheeseburgers, hot dogs, and desserts.      ?Limit or do not have caffeine. Caffeine may make symptoms such as heartburn or nausea worse.      ?Drink more liquids to prevent dehydration from diarrhea or vomiting. Ask your healthcare provider how much liquid to drink each day and which liquids are best for you.      •Keep a diary of your abdominal pain. A diary may help your healthcare provider learn what is causing your abdominal pain. Include when the pain happens, how long it lasts, and what the pain feels like. Write down any other symptoms you have with abdominal pain. Also write down what you eat, and what symptoms you have after you eat.      •Manage your stress. Stress may cause abdominal pain. Your healthcare provider may recommend relaxation techniques and deep breathing exercises to help decrease your stress. Your healthcare provider may recommend you talk to someone about your stress or anxiety, such as a counselor or a trusted friend. Get plenty of sleep and exercise regularly.  [employer] Walking for Exercise           •Limit or do not drink alcohol. Alcohol can make your abdominal pain worse. Ask your healthcare provider if it is safe for you to drink alcohol. Also ask how much is safe for you to drink.      •Do not smoke. Nicotine and other chemicals in cigarettes can damage your esophagus and stomach. Ask your healthcare provider for information if you currently smoke and need help to quit. E-cigarettes or smokeless tobacco still contain nicotine. Talk to your healthcare provider before you use these products.      Follow up with your doctor within 24 hours or as directed: Write down your questions so you remember to ask them during your visits.        Diarrhea    Diarrhea is frequent loose or watery bowel movements that has many causes. Diarrhea can make you feel weak and cause you to become dehydrated. Diarrhea typically lasts 2–3 days, but can last longer if it is a sign of something more serious. Drink clear fluids to prevent dehydration. Eat bland, easy-to-digest foods as tolerated.     SEEK IMMEDIATE MEDICAL CARE IF YOU HAVE ANY OF THE FOLLOWING SYMPTOMS: high fevers, lightheadedness/dizziness, chest pain, black or bloody stools, shortness of breath, severe abdominal or back pain, or any signs of dehydration.

## 2021-08-07 NOTE — ED PROVIDER NOTE - CARE PLAN
Principal Discharge DX:	Diarrhea, unspecified type  Secondary Diagnosis:	Upper abdominal pain, unspecified

## 2021-08-07 NOTE — ED PROVIDER NOTE - ENMT, MLM
Airway patent, Nasal mucosa clear. Mouth with mildly dry mucosa. Throat has no vesicles, uvula is midline. OP clear.

## 2021-08-07 NOTE — ED ADULT NURSE NOTE - NSFALLRSKOUTCOME_ED_ALL_ED
NOTIFICATION RETURN TO WORK  
 
6/28/2018 4:51 PM 
 
Mr. Talha Duenas [address] 
[facility] [id number] To Whom It May Concern: 
 
Talha Duenas is currently under the care of [address]. He will return to work on: Monday 7/2/18 If there are questions or concerns please have the patient contact our office.  
 
 
 
Sincerely, 
 
 
Terri Gutierrez MD 
 
                                
 

Universal Safety Interventions

## 2021-08-07 NOTE — ED ADULT NURSE REASSESSMENT NOTE - NS ED NURSE REASSESS COMMENT FT1
pain not improved with Famotidine, dr mendes aware morphine and zofran ordered and administered with immediate pain relief.

## 2022-07-04 ENCOUNTER — EMERGENCY (EMERGENCY)
Facility: HOSPITAL | Age: 32
LOS: 0 days | Discharge: HOME | End: 2022-07-04
Attending: EMERGENCY MEDICINE
Payer: MEDICAID

## 2022-07-04 VITALS
HEART RATE: 79 BPM | OXYGEN SATURATION: 96 % | RESPIRATION RATE: 18 BRPM | TEMPERATURE: 98 F | SYSTOLIC BLOOD PRESSURE: 117 MMHG | DIASTOLIC BLOOD PRESSURE: 80 MMHG

## 2022-07-04 VITALS — HEIGHT: 61 IN | WEIGHT: 130.95 LBS

## 2022-07-04 DIAGNOSIS — T19.2XXA FOREIGN BODY IN VULVA AND VAGINA, INITIAL ENCOUNTER: ICD-10-CM
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
## 2022-07-04 DIAGNOSIS — Z91.040 LATEX ALLERGY STATUS: ICD-10-CM

## 2022-07-04 PROCEDURE — 99282 EMERGENCY DEPT VISIT SF MDM: CPT

## 2022-07-04 NOTE — ED PROVIDER NOTE - OBJECTIVE STATEMENT
32-year-old female no medical history presents the emergency department for condom stuck in her vagina.  Patient states she was having sex when the condom came off and they were not able to remove it.  She denies any pain.  No other symptoms wants to be tested for STIs

## 2022-07-04 NOTE — ED PROVIDER NOTE - PHYSICAL EXAMINATION
Constitutional: NAD AAOx3  Eyes: PERRLA EOMI  Head: Normocephalic atraumatic  Mouth: MMM  Cardiac: regular rate   Resp: Lungs CTAB  GI: Abd s/nt/nd  Neuro: CN2-12 intact  Skin: No visible rashes   : yuliana mazariegos. carlyn in vagina. - removed.

## 2022-07-04 NOTE — ED PROVIDER NOTE - PATIENT PORTAL LINK FT
You can access the FollowMyHealth Patient Portal offered by [facility] by registering at the following website: [email or web address]. By joining [employer]’s FollowMyHealth portal, you will also be able to view your health information using other applications (apps) compatible with our system.

## 2022-09-13 ENCOUNTER — NON-APPOINTMENT (OUTPATIENT)
Age: 32
End: 2022-09-13

## 2022-11-28 ENCOUNTER — NON-APPOINTMENT (OUTPATIENT)
Age: 32
End: 2022-11-28

## 2023-01-16 NOTE — ED PROVIDER NOTE - DISCHARGE DATE
Body Location Override (Optional - Billing Will Still Be Based On Selected Body Map Location If Applicable): mid chest Detail Level: Detailed 04-Jul-2022 Depth Of Biopsy: dermis Was A Bandage Applied: Yes Size Of Lesion In Cm: 0.4 X Size Of Lesion In Cm: 0 Biopsy Type: H and E Biopsy Method: double edge Personna blade Anesthesia Type: 1% lidocaine with epinephrine Anesthesia Volume In Cc (Will Not Render If 0): 0.5 Hemostasis: Aluminum Chloride Wound Care: Petrolatum Dressing: bandage Destruction After The Procedure: No Type Of Destruction Used: Curettage Curettage Text: The wound bed was treated with curettage after the biopsy was performed. Cryotherapy Text: The wound bed was treated with cryotherapy after the biopsy was performed. Electrodesiccation Text: The wound bed was treated with electrodesiccation after the biopsy was performed. Electrodesiccation And Curettage Text: The wound bed was treated with electrodesiccation and curettage after the biopsy was performed. Silver Nitrate Text: The wound bed was treated with silver nitrate after the biopsy was performed. Lab: -[id number] Consent: Written consent was obtained and risks were reviewed including but not limited to scarring, infection, bleeding, scabbing, incomplete removal, nerve damage and allergy to anesthesia. Post-Care Instructions: I reviewed with the patient in detail post-care instructions. Patient is to keep the biopsy site dry overnight, and then apply bacitracin twice daily until healed. Patient may apply hydrogen peroxide soaks to remove any crusting. Notification Instructions: Patient will be notified of biopsy results. However, patient instructed to call the office if not contacted within 2 weeks. Billing Type: [employer] Information: Selecting Yes will display possible errors in your note based on the variables you have selected. This validation is only offered as a suggestion for you. PLEASE NOTE THAT THE VALIDATION TEXT WILL BE REMOVED WHEN YOU FINALIZE YOUR NOTE. IF YOU WANT TO FAX A PRELIMINARY NOTE YOU WILL NEED TO TOGGLE THIS TO 'NO' IF YOU DO NOT WANT IT IN YOUR FAXED NOTE.

## 2023-06-14 ENCOUNTER — NON-APPOINTMENT (OUTPATIENT)
Age: 33
End: 2023-06-14

## 2024-05-08 ENCOUNTER — EMERGENCY (EMERGENCY)
Facility: HOSPITAL | Age: 34
LOS: 1 days | Discharge: HOME | End: 2024-05-08
Attending: EMERGENCY MEDICINE | Admitting: EMERGENCY MEDICINE
Payer: MEDICAID

## 2024-05-08 PROCEDURE — 99284 EMERGENCY DEPT VISIT MOD MDM: CPT | Mod: 25

## 2024-05-09 VITALS
SYSTOLIC BLOOD PRESSURE: 133 MMHG | OXYGEN SATURATION: 100 % | DIASTOLIC BLOOD PRESSURE: 87 MMHG | HEART RATE: 66 BPM | RESPIRATION RATE: 16 BRPM | WEIGHT: 136.91 LBS | HEIGHT: 61 IN | TEMPERATURE: 98 F

## 2024-05-09 LAB — HCG UR QL: NEGATIVE — SIGNIFICANT CHANGE UP

## 2024-05-09 PROCEDURE — 99283 EMERGENCY DEPT VISIT LOW MDM: CPT

## 2024-05-09 PROCEDURE — 81025 URINE PREGNANCY TEST: CPT

## 2024-05-09 RX ORDER — IBUPROFEN 200 MG
600 TABLET ORAL ONCE
Refills: 0 | Status: COMPLETED | OUTPATIENT
Start: 2024-05-09 | End: 2024-05-09

## 2024-05-09 RX ADMIN — Medication 600 MILLIGRAM(S): at 00:54

## 2024-05-09 RX ADMIN — Medication 1 TABLET(S): at 00:53

## 2024-05-09 NOTE — ED PROVIDER NOTE - OBJECTIVE STATEMENT
34y F c/o 2 painful lumps left scalp and ear ringing, started since yesterday, no fever, has had seasonal allergy symptoms for the past week, thinks allergy and not illness, has pain occipital scalp as well, no trauma, no open wounds, ear does not hurt 34y F c/o 2 painful lumps left scalp and ear ringing, started since yesterday, no fever, has had seasonal allergy symptoms for the past week, thinks allergy and not illness, has pain occipital scalp as well, no trauma, no open wounds, ear does not hurt, no sore throat, no cough

## 2024-05-09 NOTE — ED PROVIDER NOTE - NSICDXNOPASTSURGICALHX_GEN_ALL_ED
<-- Click to add NO significant Past Surgical History
- - -

## 2024-05-09 NOTE — ED ADULT TRIAGE NOTE - CHIEF COMPLAINT QUOTE
I have 2 bumps ot left side posterior head since yesterday and it feels bigger today, it is burning and my head feels heavy

## 2024-05-09 NOTE — ED PROVIDER NOTE - CLINICAL SUMMARY MEDICAL DECISION MAKING FREE TEXT BOX
left scalp ttp and 2 tender nodules - ln vs abscess - ears clear, tinnitus likely inflammatory related to nodules - will give nsaid and abx, advise f/u pcp

## 2024-05-09 NOTE — ED ADULT NURSE NOTE - PRIMARY CARE PROVIDER
Name: Mai Glaser      : 1953      MRN: [id number]  Encounter Provider: Jaqui Serrano MD  Encounter Date: 2023   Encounter department: [address]     1  Moderate persistent asthma with acute exacerbation  Assessment & Plan:  Recurrent acute exacerbation of asthma with significant wheezing diffusely with coarse breath sounds in a patient with chronic respiratory failure, hypoxia, hypercarbia, restrictive pulmonary disease, obesity,  Patient just tarted using CPAP yesterday  Symptoms has been getting worse  Last time patient had a outpatient treatment failure and recommend the hospital   Patient appears somewhat more sick at this time with diffuse wheezing  Discussed with the patient and family  Will refer to the emergency room  Patient currently at home has been already on torsemide, Flonase Wixela  Patient also has multiple cardiac comorbidities  In fact patient was supposed to go for the breast surgery in the next Tuesday I will hold off that right now family notified of that  Patients may end up staying in the emergency room  This is all has been explained to the patient's  2  Chronic respiratory failure with hypoxia and hypercapnia (HCC)    3  Lung nodule    4  Multiple pulmonary nodules determined by computed tomography of lung    5  Acute on chronic respiratory failure with hypoxia and hypercapnia (HCC)    6  KATRINA (obstructive sleep apnea)    7  Chronic obstructive pulmonary disease, unspecified COPD type ([address] )    8  Paroxysmal atrial fibrillation (HCC)    9  Peripheral venous insufficiency           Subjective     Chief complaint cough chest congestion shortness of breath and wheezing      HPI 34-year-old female very well-known to me who who is a known case of restrictive pulmonary disease, history of CO2 retention, history of acute on chronic respiratory failure, history of obstructive sleep apnea, history of hypothyroidism GERD chronic diastolic CHF multiple lung nodule on diabetes obesity presents with 5 days history of progressive worsening of the cough, to progressive worsening of the chest congestion shortness of breath and wheezing  Persist short of breath even at rest   Patient denies any fever patient is a dark yellow phlegm production  Patient denies any pleuritic pain hemoptysis  Denies any significant stuffy nose runny nose sore throat earache  Patient was recently hospitalized to 3 weeks ago with history of worsening of the shortness of breath despite outpatient treatment  Patient require steroid antibiotic  Patient is a known case of 2 L oxygen has not been compliant with BiPAP but recently was given the CPAP with seizure started using yesterday  She appears fatigued  Review of Systems   Constitutional: Positive for fatigue  Negative for chills and fever  HENT: Positive for congestion and postnasal drip  Negative for ear pain, rhinorrhea and sore throat  Eyes: Negative for pain and visual disturbance  Respiratory: Positive for cough, shortness of breath and wheezing  Cardiovascular: Negative for chest pain and palpitations  Gastrointestinal: Negative for abdominal pain, constipation, diarrhea and vomiting  Genitourinary: Negative for dysuria and hematuria  Musculoskeletal: Negative for arthralgias, back pain and myalgias  Skin: Negative for color change and rash  Neurological: Negative for dizziness, seizures, syncope, weakness and headaches  Psychiatric/Behavioral: Negative for agitation, confusion and hallucinations  All other systems reviewed and are negative        Past Medical History:   Diagnosis Date   • SEBASTIAN (acute kidney injury) ([facility] ) 01/23/2023   • Anemia    • Asthma    • Chronic kidney disease    • COPD (chronic obstructive pulmonary disease) ([facility] )    • COVID-19     January 2022   • CPAP (continuous positive airway pressure) dependence    • Diabetes mellitus ([facility] )    • GERD (gastroesophageal reflux disease)    • Hyperlipidemia    • Hypertension    • PONV (postoperative nausea and vomiting)    • Sleep apnea    • SVT (supraventricular tachycardia) ([facility])      Past Surgical History:   Procedure Laterality Date   • APPENDECTOMY     • BREAST BIOPSY Right     years ago when she was 20   • BREAST BIOPSY Right 03/13/2023   • CYSTOSCOPY W/ LASER LITHOTRIPSY Left 07/12/2016    Procedure: CYSTOSCOPY URETEROSCOPY WITH LITHOTRIPSY HOLMIUM LASER, RETROGRADE PYELOGRAM AND INSERTION STENT URETERAL;  Surgeon: Umesh Mendez MD;  Location: [address] [place] [address];  Service:    • DILATION AND CURETTAGE OF UTERUS     • IR THORACENTESIS  03/18/2022   • JOINT REPLACEMENT      right knee   • KNEE ARTHROPLASTY Right    • MAMMO NEEDLE LOCALIZATION LEFT (ALL INC) EACH ADD Right 4/24/2023   • MAMMO STEREOTACTIC BREAST BIOPSY RIGHT (ALL INC) Right 03/13/2023    High Risk Lesion   • AK ARTHROPLASTY GLENOHUMERAL JOINT TOTAL SHOULDER Left 03/01/2022    Procedure: ARTHROPLASTY SHOULDER REVERSE;  Surgeon: Zak Holguin MD;  Location: [facility];  Service: Orthopedics   • AK CYSTO BLADDER W/URETERAL CATHETERIZATION Left 06/29/2016    Procedure: CYSTOSCOPY RETROGRADE PYELOGRAM WITH INSERTION STENT URETERAL, left;  Surgeon: Umesh Mendez MD;  Location: [facility];  Service: Urology   • SHOULDER ARTHROTOMY Left      Family History   Problem Relation Age of Onset   • Heart disease Mother    • Diabetes Father    • Thyroid cancer Sister    • Heart disease Brother    • Diabetes Brother    • Heart disease Brother    • Heart disease Brother    • Emphysema Maternal Grandmother    • Heart disease Family      Social History     Socioeconomic History   • Marital status: Single     Spouse name: None   • Number of children: 0   • Years of education: 15   • Highest education level: Associate degree: academic program   Occupational History   • None   Tobacco Use   • Smoking status: Former     Packs/day: 1 00     Years: 20 00     Pack years: 20 00     Types: Cigarettes     Quit date: 1996     Years since quittin 8   • Smokeless tobacco: Never   Vaping Use   • Vaping Use: Never used   Substance and Sexual Activity   • Alcohol use: Not Currently     Comment: rarely   • Drug use: Never   • Sexual activity: Not Currently   Other Topics Concern   • None   Social History Narrative    ** Merged History Encounter **          Social Determinants of Health     Financial Resource Strain: Not on file   Food Insecurity: No Food Insecurity   • Worried About Running Out of Food in the Last Year: Never true   • Ran Out of Food in the Last Year: Never true   Transportation Needs: No Transportation Needs   • Lack of Transportation (Medical): No   • Lack of Transportation (Non-Medical):  No   Physical Activity: Not on file   Stress: Not on file   Social Connections: Not on file   Intimate Partner Violence: Not on file   Housing Stability: Low Risk    • Unable to Pay for Housing in the Last Year: No   • Number of Places Lived in the Last Year: 2   • Unstable Housing in the Last Year: No     Current Outpatient Medications on File Prior to Visit   Medication Sig   • albuterol (PROVENTIL HFA,VENTOLIN HFA) 90 mcg/act inhaler Inhale 2 puffs every 6 (six) hours as needed for wheezing   • Albuterol Sulfate (albuterol, FOR EMS ONLY,) (2 5 mg/3 mL) 0 083 % nebulizer solution Take 2 5 mg by nebulization every 6 (six) hours as needed for wheezing   • ammonium lactate (LAC-HYDRIN) 12 % lotion APPLY TOPICALLY TO AFFECTED AREAS twice a day   • apixaban (ELIQUIS) 5 mg Take 5 mg by mouth 2 (two) times a day   • B Complex-C-Folic Acid (triphrocaps) 1 MG CAPS take 1 capsule by mouth once daily   • docusate sodium (COLACE) 100 mg capsule Take 100 mg by mouth in the morning   • fluticasone (FLONASE) 50 mcg/act nasal spray 1 spray into each nostril daily   • Fluticasone-Salmeterol (Wixela Inhub) 250-50 mcg/dose inhaler Inhale 1 puff 2 (two) times a day Rinse mouth after use  (Patient taking differently: Inhale 1 puff 2 (two) times a day as needed Rinse mouth after use )   • glucose blood (OneTouch Verio) test strip Use 1 each 4 (four) times a day Use as instructed   • insulin glargine (Toujeo SoloStar) 300 units/mL CONCENTRATED U-300 injection pen (1-unit dial) Inject 25 Units under the skin every 12 (twelve) hours   • insulin lispro (HumaLOG KwikPen) 100 units/mL injection pen Use 10 units prior to each meal +1 unit per 50 above 150 mg/dL   • Insulin Pen Needle (BD Pen Needle Pilar 2nd Gen) 32G X 4 MM MISC For use with insulin pen  Pharmacy may dispense brand covered by insurance     • Insulin Pen Needle (NovoFine Autocover) 30G X 8 MM MISC Inject under the skin daily   • Insulin Pen Needle 30G X 8 MM MISC 2 (two) times a day   • Insulin Pen Needle 31G X 8 MM MISC Use daily Inject under the skin   • Lancets (onetouch ultrasoft) lancets Use once daily   • metoprolol succinate (TOPROL-XL) 50 mg 24 hr tablet take 1 tablet by mouth once daily   • montelukast (SINGULAIR) 10 mg tablet Take 10 mg by mouth daily at bedtime   • NovoFine Autocover Pen Needle 30G X 8 MM MISC USE TWICE A DAY   • nystatin (MYCOSTATIN) powder Apply topically 2 (two) times a day   • pregabalin (LYRICA) 100 mg capsule take 1 capsule by mouth twice a day   • rosuvastatin (CRESTOR) 10 MG tablet take 1 tablet by mouth once daily   • semaglutide, 1 mg/dose, (Ozempic, 1 MG/DOSE,) 4 mg/3 mL injection pen Inject 0 75 mL (1 mg total) under the skin every 7 days   • torsemide (DEMADEX) 20 mg tablet Take 2 tablets in the AM     Allergies   Allergen Reactions   • Penicillins Hives   • Moxifloxacin Other (See Comments)     unknown   • Oxycodone-Acetaminophen Other (See Comments)     GI upset   • Zinc Acetate Other (See Comments)     unknown   • Egg Yolk - Food Allergy Other (See Comments)   • Penicillins Hives   • Asa [Aspirin] GI Intolerance   • Indocin [Indomethacin] Other (See Comments)     Made "patient \"loopy\"   • Other Other (See Comments)     unknown   • Tannic Acid Rash     Immunization History   Administered Date(s) Administered   • COVID-19 MODERNA VACC 0 5 ML IM 02/11/2022   • COVID-19 Moderna vac 6-11y or adult booster 50 mcg/0 5 mL 03/11/2022   • Tdap 01/22/2023   • Tuberculin Skin Test 02/20/2022   • Unknown 02/11/2022       Objective     /70   Pulse 84   Ht 5' 2\" (1 575 m)   Wt 117 kg (259 lb)   SpO2 96%   BMI 47 37 kg/m²     Physical Exam  Constitutional:       General: She is not in acute distress  Appearance: Normal appearance  She is well-developed  She is obese  She is ill-appearing  She is not toxic-appearing or diaphoretic  HENT:      Head: Normocephalic and atraumatic  Right Ear: External ear normal       Left Ear: External ear normal       Nose: Nose normal  No congestion or rhinorrhea  Mouth/Throat:      Pharynx: Oropharynx is clear  No oropharyngeal exudate or posterior oropharyngeal erythema  Eyes:      General: Lids are normal          Left eye: No discharge  Conjunctiva/sclera: Conjunctivae normal    Neck:      Thyroid: No thyroid mass or thyromegaly  Vascular: No JVD  Trachea: Trachea normal    Cardiovascular:      Rate and Rhythm: Normal rate and regular rhythm  Pulses: Normal pulses  Heart sounds: Normal heart sounds  No murmur heard  Pulmonary:      Effort: Pulmonary effort is normal       Breath sounds: Wheezing and rhonchi present  Comments: Somewhat diminished air entry but this is to the baseline  She was able to walk without oxygen from car to here she did get little fatigued but her oxygen level was adequate  Chest:      Chest wall: No tenderness  Abdominal:      General: There is no distension  Palpations: There is no mass  Tenderness: There is no abdominal tenderness  Hernia: No hernia is present  Musculoskeletal:         General: Normal range of motion        Cervical back: Normal range of " motion  Right lower leg: Edema present  Left lower leg: Edema present  Skin:     General: Skin is warm  Findings: No bruising, erythema or lesion  Neurological:      Mental Status: She is alert  Sensory: No sensory deficit  Gait: Gait normal    Psychiatric:         Mood and Affect: Mood normal          Behavior: Behavior normal          Thought Content:  Thought content normal          Judgment: Judgment normal        Sunshine Galvan MD Doc

## 2024-05-09 NOTE — ED PROVIDER NOTE - PATIENT PORTAL LINK FT
brilinta 
You can access the FollowMyHealth Patient Portal offered by [facility] by registering at the following website: [email or web address]. By joining [facility]’s FollowMyHealth portal, you will also be able to view your health information using other applications (apps) compatible with our system.

## 2024-05-09 NOTE — ED PROVIDER NOTE - NSFOLLOWUPINSTRUCTIONS_ED_ALL_ED_FT
Adenitis    WHAT YOU NEED TO KNOW:    What is adenitis? Adenitis is a condition that causes your lymph nodes to become swollen and tender. You may also have a fever. Adenitis is a sign of infection usually caused by bacteria.    What increases my risk for adenitis?    IV drug use    Contact sports    Animal bites or scratches    Infection in your mouth and throat    Recent surgery or hospital stay  How is adenitis diagnosed and treated? Your healthcare provider will examine you to find the cause of your adenitis. A biopsy of the swollen node may be needed. You may need any of the following to treat adenitis:    Antibiotics help treat a bacterial infection.    Acetaminophen decreases pain and fever. It is available without a doctor's order. Ask how much to take and how often to take it. Follow directions. Read the labels of all other medicines you are using to see if they also contain acetaminophen, or ask your doctor or pharmacist. Acetaminophen can cause liver damage if not taken correctly.    NSAIDs, such as ibuprofen, help decrease swelling, pain, and fever. NSAIDs can cause stomach bleeding or kidney problems in certain people. If you take blood thinner medicine, always ask your healthcare provider if NSAIDs are safe for you. Always read the medicine label and follow directions.  How can I manage my symptoms?    Apply moist heat on your swollen lymph nodes for 20 to 30 minutes every 2 hours or as directed. Heat helps decrease pain and swelling. You can make a moist heat pack by soaking a small towel in hot water. Let it cool until you can hold it with your bare hands. Then wring out the extra water. Place the towel in a plastic bag, and wrap the bag with a dry towel around the bag. Place the pack over your swollen lymph nodes.    Elevate your head and upper back. Keep your head and upper back elevated when you rest, such as in a recliner. Place extra pillows under your head and neck when you sleep in bed. Elevation helps decrease swelling.  What can I do to prevent an infection?      Wash your hands often. Wash your hands several times each day. Wash after you use the bathroom, change a child's diaper, and before you prepare or eat food. Use soap and water every time. Rub your soapy hands together, lacing your fingers. Wash the front and back of your hands, and in between your fingers. Use the fingers of one hand to scrub under the fingernails of the other hand. Wash for at least 20 seconds. Rinse with warm, running water for several seconds. Then dry your hands with a clean towel or paper towel. Use hand  that contains alcohol if soap and water are not available. Do not touch your eyes, nose, or mouth without washing your hands first.  Handwashing      Cover a sneeze or cough. Use a tissue that covers your mouth and nose. Throw the tissue away in a trash can right away. Use the bend of your arm if a tissue is not available. Wash your hands well with soap and water or use a hand .    Clean surfaces often. Clean doorknobs, countertops, cell phones, and other surfaces that are touched often. Use a disinfecting wipe, a single-use sponge, or a cloth you can wash and reuse. Use disinfecting  if you do not have wipes. You can create a disinfecting  by mixing 1 part bleach with 10 parts water.    Ask about vaccines you may need. Vaccines help prevent diseases caused by some viruses and bacteria. Get the influenza (flu) vaccine as soon as recommended each year. The flu vaccine is usually available starting in September or October. Flu viruses change, so it is important to get a flu vaccine every year. Get the pneumonia vaccine if recommended. This vaccine is usually recommended every 5 years. Your provider will tell you when to get this vaccine, if needed. He or she can tell you if you should get other vaccines, and when to get them.  Call your local emergency number (911 in the [place]) if:    You have trouble breathing or swallowing.    When should I seek immediate care?    You have new or worsening redness or swelling.    You develop a large, soft bump that may leak pus.  When should I call my doctor?    Your symptoms do not improve after 10 days of treatment.    You have questions or concerns about your condition or care.  CARE AGREEMENT:    You have the right to help plan your care. Learn about your health condition and how it may be treated. Discuss treatment options with your healthcare providers to decide what care you want to receive. You always have the right to refuse treatment.

## 2024-05-29 NOTE — ED ADULT NURSE NOTE - HIV OFFER
Physical Therapy Discharge    Visit Type: Discharge Summary- Daily Treatment Note  Visit: 5  Referring Provider: Marla Vasquez MD  Medical Diagnosis (from order): M54.2 - Neck pain on left side  M77.8 - Tendinitis of left shoulder  M75.22 - Biceps tendonitis on left     SUBJECTIVE                                                                                                               Patient reports neck is doing great. Left shoulder still seems to be a little more limiting. Patient would like to work on shoulder independently through home exercise program - would like to make today final visit.  Current Functional Limitations: Refer to itemized QDASH/NDI      OBJECTIVE                                                                                                                     Range of Motion (ROM)   (degrees unless noted; active unless noted; norms in ( ); negative=lacking to 0, positive=beyond 0)  Shoulder:    - Flexion (180):         Left: WNL, pain     - Abduction (180):         Left: WNL, pain  Cervical:    - Flexion (45-50): WNL    - Extension (45-60): WNL    - Rotation (60-80):         Left: WNL         Right: WNL    - Side Bend (45):         Left: WNL         Right: WNL                    Outcome/Assessments  Outcome Measures:   Neck Disability Index (NDI): Neck Disability Index Score: 4  NDI Total Possible Score: 50  NDI Score Calculated: 8 %  (scored 0-100, higher score indicates higher disability) see flowsheet for additional documentation    Outcome Measures:   Quick Disabilities of the Arm, Shoulder and Hand: QuickDash Total Score (Score will not calculate if more then 2 questions are left blank): 50   (scored 0-100; a higher score indicates greater disability) see flowsheet for additional documentation    Treatment     Therapeutic Exercise  - Upper Trap Self Soft Tissue Mobilization - tennis ball (performed bilaterally)  - Posterior Cuff Tendon Self Soft Tissue Mobilization - tennis  ball (performed bilaterally)  - Verbal review of home exercise program    Manual Therapy   - Prone CTJ Rotation HVLA Mobilization - Gr V -- x1 bilaterally (multiple cavitations)   - Seated CTJ Closing HVLA Mobilization - Gr V -- x1 (one cavitation)  - Soft Tissue Mobilization - left upper trapezius, left infraspinatus, left supraspinatus -- one bout each to tolerance    Therapist explained the rationale as well as potential benefits and risks associated with a grade V, high velocity low amplitude mobilization. Patient was given the opportunity to ask questions and consented to the intervention.         Skilled input: verbal instruction/cues    Writer verbally educated and received verbal consent for hand placement, positioning of patient, and techniques to be performed today from patient for therapist position for techniques as described above and how they are pertinent to the patient's plan of care.  Home Exercise Program  Access Code: [email or web address]  URL: [email or web address]  Date: 05/07/2024  Prepared by: Mina Pacheco    Exercises  - Seated Wrist Extension with Dumbbell (Mirrored)  - 1 x daily - 2 sets - 20 reps  - Standing Isometric Shoulder External Rotation with Doorway  - 1 x daily - 2 sets - 10 reps - 5sec hold  - Upper Thoracic Extension - Vertical Towel  - 2-3 x daily - 1 sets - 10 reps  - Sidelying Shoulder External Rotation - 2 pounds  - 1 x daily - 2 sets - 15 reps  - Sidelying Shoulder Abduction - 2 pounds  - 1 x daily - 2 sets - 15 reps  - Shoulder Circles on Wall with Towel  - 1 x daily - 2 sets - 20sec hold  - Job Wheels - Elbow & Foot Closest to Wall Forward  - 1 x daily - 2 sets - 10 reps  - Chest Opening Stretch - Towel Roll  - 1 x daily - 2 sets - 10 reps - 3sec hold  - Forearm Pronation and Supination with Hammer  - 1 x daily - 2 sets - 15 reps  - Push-Up on Counter  - 1 x daily - 2 sets - 6 reps - 10sec hold  - Standing Shoulder Internal Rotation with Anchored Resistance  -  1 x daily - 2 sets - 15 reps      ASSESSMENT                                                                                                          To date the patient has made gains as expected.    Patient has been seen for 5 total visits for neck pain and left shoulder pain. Neck pain and function have improved significantly evidenced by NDI, subjective report, and objective measures. Shoulder pain still present, but will require continued home exercise program efforts. Has reduced pain with overhead motion after all shoulder treatments today.  Education:   - Results of above outlined education: Verbalizes understanding    PLAN                                                                                                                           Discharge from skilled therapy with instructions/recommendations to: continue home exercise program    Suggestions for next session as indicated: Progress per plan of care    Goals  Decrease pain/symptoms to 2/10 with activity  Improve involved strength to 4+/5  Improve involved ROM to equivocal to uninvolved  The above improvements in impairments to assist in obtaining goals listed below  Long Term Goals: to be met by end of plan of care  1. Patient will be able to reach overhead and behind back without increase in pain or limitation to perform all hygiene activities. Status: progressing/ongoing  2. Patient will be able to lift and carry at least 25# overhead without increase in pain or limitation in order to perform heavy household and yard tasks. Status: progressing/ongoing  3. Patient will score at least +4 on the Global Rating of Change 15-Point Scale in order to demonstrate at least moderate improvement in perceived pain and function. Status: met   4. Patient will be independent with progressed and modified home exercise program.  Status: met   5. Neck Disability Index: Patient will score 10% or lower on The Neck Disability Index to indicate a decreased level of  impairment related to neck or arm symptoms. (minimal clinically important difference: 12% of calculated score) Status: met   6. Quick DASH: Patient will score 15 or lower on The Quick DASH to indicate a decreased level of impairment with lifting, carrying, household and self-care activity. (minimal clinically important difference: 14 of calculated score) Status: progressing/ongoing      Therapy procedure time and total treatment time can be found documented on the Time Entry flowsheet     Opt out

## 2024-06-05 NOTE — ED ADULT NURSE NOTE - ISOLATION TYPE:
None PAST SURGICAL HISTORY:  Abdominal hernia umbilical    History of cholecystectomy     History of colon resection diverticulitis    History of sleeve gastrectomy     History of surgery 2019 L thr    History of surgery right total hip replacement 9/12/2019

## 2024-07-08 ENCOUNTER — EMERGENCY (EMERGENCY)
Facility: HOSPITAL | Age: 34
LOS: 0 days | Discharge: HOME | End: 2024-07-08
Attending: EMERGENCY MEDICINE
Payer: COMMERCIAL

## 2024-07-08 VITALS
HEART RATE: 78 BPM | TEMPERATURE: 98 F | WEIGHT: 128.31 LBS | RESPIRATION RATE: 18 BRPM | SYSTOLIC BLOOD PRESSURE: 121 MMHG | OXYGEN SATURATION: 100 % | DIASTOLIC BLOOD PRESSURE: 80 MMHG

## 2024-07-08 VITALS
RESPIRATION RATE: 16 BRPM | SYSTOLIC BLOOD PRESSURE: 123 MMHG | OXYGEN SATURATION: 100 % | TEMPERATURE: 98 F | HEART RATE: 80 BPM | DIASTOLIC BLOOD PRESSURE: 80 MMHG

## 2024-07-08 DIAGNOSIS — Z91.040 LATEX ALLERGY STATUS: ICD-10-CM

## 2024-07-08 DIAGNOSIS — O23.40 UNSPECIFIED INFECTION OF URINARY TRACT IN PREGNANCY, UNSPECIFIED TRIMESTER: ICD-10-CM

## 2024-07-08 DIAGNOSIS — Z32.01 ENCOUNTER FOR PREGNANCY TEST, RESULT POSITIVE: ICD-10-CM

## 2024-07-08 LAB
APPEARANCE UR: ABNORMAL
BACTERIA # UR AUTO: ABNORMAL /HPF
BILIRUB UR-MCNC: NEGATIVE — SIGNIFICANT CHANGE UP
CAST: 0 /LPF — SIGNIFICANT CHANGE UP (ref 0–4)
COLOR SPEC: YELLOW — SIGNIFICANT CHANGE UP
DIFF PNL FLD: NEGATIVE — SIGNIFICANT CHANGE UP
EPI CELLS # UR: PRESENT
GLUCOSE UR QL: NEGATIVE MG/DL — SIGNIFICANT CHANGE UP
KETONES UR-MCNC: ABNORMAL MG/DL
LEUKOCYTE ESTERASE UR-ACNC: ABNORMAL
NITRITE UR-MCNC: NEGATIVE — SIGNIFICANT CHANGE UP
PH UR: 6 — SIGNIFICANT CHANGE UP (ref 5–8)
PROT UR-MCNC: NEGATIVE MG/DL — SIGNIFICANT CHANGE UP
RBC CASTS # UR COMP ASSIST: 8 /HPF — HIGH (ref 0–4)
SP GR SPEC: 1.02 — SIGNIFICANT CHANGE UP (ref 1–1.03)
SQUAMOUS # UR AUTO: 20 /HPF — HIGH (ref 0–5)
UROBILINOGEN FLD QL: 0.2 MG/DL — SIGNIFICANT CHANGE UP (ref 0.2–1)
WBC UR QL: 2 /HPF — SIGNIFICANT CHANGE UP (ref 0–5)

## 2024-07-08 PROCEDURE — 99284 EMERGENCY DEPT VISIT MOD MDM: CPT

## 2024-07-08 PROCEDURE — 99283 EMERGENCY DEPT VISIT LOW MDM: CPT

## 2024-07-08 RX ORDER — NITROFURANTOIN MACROCRYSTAL 100 MG
1 CAPSULE ORAL
Qty: 14 | Refills: 0
Start: 2024-07-08 | End: 2024-07-14

## 2024-07-11 ENCOUNTER — EMERGENCY (EMERGENCY)
Facility: HOSPITAL | Age: 34
LOS: 1 days | Discharge: HOME | End: 2024-07-11
Admitting: STUDENT IN AN ORGANIZED HEALTH CARE EDUCATION/TRAINING PROGRAM
Payer: COMMERCIAL

## 2024-07-11 VITALS
HEART RATE: 76 BPM | HEIGHT: 61 IN | SYSTOLIC BLOOD PRESSURE: 114 MMHG | RESPIRATION RATE: 16 BRPM | OXYGEN SATURATION: 99 % | WEIGHT: 128.09 LBS | DIASTOLIC BLOOD PRESSURE: 77 MMHG | TEMPERATURE: 99 F

## 2024-07-11 VITALS
SYSTOLIC BLOOD PRESSURE: 131 MMHG | RESPIRATION RATE: 17 BRPM | HEART RATE: 61 BPM | OXYGEN SATURATION: 100 % | DIASTOLIC BLOOD PRESSURE: 86 MMHG | TEMPERATURE: 99 F

## 2024-07-11 LAB
ALBUMIN SERPL ELPH-MCNC: 4.6 G/DL — SIGNIFICANT CHANGE UP (ref 3.3–5)
ALP SERPL-CCNC: 66 U/L — SIGNIFICANT CHANGE UP (ref 40–120)
ALT FLD-CCNC: 15 U/L — SIGNIFICANT CHANGE UP (ref 4–33)
ANION GAP SERPL CALC-SCNC: 12 MMOL/L — SIGNIFICANT CHANGE UP (ref 7–14)
APPEARANCE UR: CLEAR — SIGNIFICANT CHANGE UP
AST SERPL-CCNC: 18 U/L — SIGNIFICANT CHANGE UP (ref 4–32)
BACTERIA # UR AUTO: NEGATIVE /HPF — SIGNIFICANT CHANGE UP
BASOPHILS # BLD AUTO: 0.01 K/UL — SIGNIFICANT CHANGE UP (ref 0–0.2)
BASOPHILS NFR BLD AUTO: 0.2 % — SIGNIFICANT CHANGE UP (ref 0–2)
BILIRUB SERPL-MCNC: 0.5 MG/DL — SIGNIFICANT CHANGE UP (ref 0.2–1.2)
BILIRUB UR-MCNC: NEGATIVE — SIGNIFICANT CHANGE UP
BLD GP AB SCN SERPL QL: NEGATIVE — SIGNIFICANT CHANGE UP
BUN SERPL-MCNC: 14 MG/DL — SIGNIFICANT CHANGE UP (ref 7–23)
CALCIUM SERPL-MCNC: 9.3 MG/DL — SIGNIFICANT CHANGE UP (ref 8.4–10.5)
CAST: 0 /LPF — SIGNIFICANT CHANGE UP (ref 0–4)
CHLORIDE SERPL-SCNC: 102 MMOL/L — SIGNIFICANT CHANGE UP (ref 98–107)
CO2 SERPL-SCNC: 24 MMOL/L — SIGNIFICANT CHANGE UP (ref 22–31)
COLOR SPEC: YELLOW — SIGNIFICANT CHANGE UP
CREAT SERPL-MCNC: 0.68 MG/DL — SIGNIFICANT CHANGE UP (ref 0.5–1.3)
DIFF PNL FLD: NEGATIVE — SIGNIFICANT CHANGE UP
EGFR: 116 ML/MIN/1.73M2 — SIGNIFICANT CHANGE UP
EOSINOPHIL # BLD AUTO: 0.09 K/UL — SIGNIFICANT CHANGE UP (ref 0–0.5)
EOSINOPHIL NFR BLD AUTO: 1.6 % — SIGNIFICANT CHANGE UP (ref 0–6)
GLUCOSE SERPL-MCNC: 113 MG/DL — HIGH (ref 70–99)
GLUCOSE UR QL: NEGATIVE MG/DL — SIGNIFICANT CHANGE UP
HCG SERPL-ACNC: 25.1 MIU/ML — SIGNIFICANT CHANGE UP
HCT VFR BLD CALC: 39.9 % — SIGNIFICANT CHANGE UP (ref 34.5–45)
HGB BLD-MCNC: 13.8 G/DL — SIGNIFICANT CHANGE UP (ref 11.5–15.5)
IANC: 3.35 K/UL — SIGNIFICANT CHANGE UP (ref 1.8–7.4)
IMM GRANULOCYTES NFR BLD AUTO: 0.2 % — SIGNIFICANT CHANGE UP (ref 0–0.9)
KETONES UR-MCNC: ABNORMAL MG/DL
LEUKOCYTE ESTERASE UR-ACNC: NEGATIVE — SIGNIFICANT CHANGE UP
LYMPHOCYTES # BLD AUTO: 1.89 K/UL — SIGNIFICANT CHANGE UP (ref 1–3.3)
LYMPHOCYTES # BLD AUTO: 33.3 % — SIGNIFICANT CHANGE UP (ref 13–44)
MCHC RBC-ENTMCNC: 31.5 PG — SIGNIFICANT CHANGE UP (ref 27–34)
MCHC RBC-ENTMCNC: 34.6 GM/DL — SIGNIFICANT CHANGE UP (ref 32–36)
MCV RBC AUTO: 91.1 FL — SIGNIFICANT CHANGE UP (ref 80–100)
MONOCYTES # BLD AUTO: 0.32 K/UL — SIGNIFICANT CHANGE UP (ref 0–0.9)
MONOCYTES NFR BLD AUTO: 5.6 % — SIGNIFICANT CHANGE UP (ref 2–14)
NEUTROPHILS # BLD AUTO: 3.35 K/UL — SIGNIFICANT CHANGE UP (ref 1.8–7.4)
NEUTROPHILS NFR BLD AUTO: 59.1 % — SIGNIFICANT CHANGE UP (ref 43–77)
NITRITE UR-MCNC: NEGATIVE — SIGNIFICANT CHANGE UP
NRBC # BLD: 0 /100 WBCS — SIGNIFICANT CHANGE UP (ref 0–0)
NRBC # FLD: 0 K/UL — SIGNIFICANT CHANGE UP (ref 0–0)
PH UR: 6.5 — SIGNIFICANT CHANGE UP (ref 5–8)
PLATELET # BLD AUTO: 233 K/UL — SIGNIFICANT CHANGE UP (ref 150–400)
POTASSIUM SERPL-MCNC: 3.7 MMOL/L — SIGNIFICANT CHANGE UP (ref 3.5–5.3)
POTASSIUM SERPL-SCNC: 3.7 MMOL/L — SIGNIFICANT CHANGE UP (ref 3.5–5.3)
PROT SERPL-MCNC: 7.6 G/DL — SIGNIFICANT CHANGE UP (ref 6–8.3)
PROT UR-MCNC: NEGATIVE MG/DL — SIGNIFICANT CHANGE UP
RBC # BLD: 4.38 M/UL — SIGNIFICANT CHANGE UP (ref 3.8–5.2)
RBC # FLD: 11.7 % — SIGNIFICANT CHANGE UP (ref 10.3–14.5)
RBC CASTS # UR COMP ASSIST: 2 /HPF — SIGNIFICANT CHANGE UP (ref 0–4)
RH IG SCN BLD-IMP: POSITIVE — SIGNIFICANT CHANGE UP
SODIUM SERPL-SCNC: 138 MMOL/L — SIGNIFICANT CHANGE UP (ref 135–145)
SP GR SPEC: 1.02 — SIGNIFICANT CHANGE UP (ref 1–1.03)
SQUAMOUS # UR AUTO: 3 /HPF — SIGNIFICANT CHANGE UP (ref 0–5)
UROBILINOGEN FLD QL: 0.2 MG/DL — SIGNIFICANT CHANGE UP (ref 0.2–1)
WBC # BLD: 5.67 K/UL — SIGNIFICANT CHANGE UP (ref 3.8–10.5)
WBC # FLD AUTO: 5.67 K/UL — SIGNIFICANT CHANGE UP (ref 3.8–10.5)
WBC UR QL: 1 /HPF — SIGNIFICANT CHANGE UP (ref 0–5)

## 2024-07-11 PROCEDURE — 99285 EMERGENCY DEPT VISIT HI MDM: CPT

## 2024-07-11 PROCEDURE — 76830 TRANSVAGINAL US NON-OB: CPT | Mod: 26

## 2024-07-12 ENCOUNTER — EMERGENCY (EMERGENCY)
Facility: HOSPITAL | Age: 34
LOS: 1 days | Discharge: HOME | End: 2024-07-12
Attending: EMERGENCY MEDICINE | Admitting: EMERGENCY MEDICINE
Payer: COMMERCIAL

## 2024-07-12 VITALS
DIASTOLIC BLOOD PRESSURE: 67 MMHG | WEIGHT: 119.93 LBS | SYSTOLIC BLOOD PRESSURE: 131 MMHG | HEIGHT: 61 IN | RESPIRATION RATE: 18 BRPM | OXYGEN SATURATION: 98 % | TEMPERATURE: 98 F | HEART RATE: 77 BPM

## 2024-07-12 PROCEDURE — 99285 EMERGENCY DEPT VISIT HI MDM: CPT | Mod: 25

## 2024-07-12 PROCEDURE — 99283 EMERGENCY DEPT VISIT LOW MDM: CPT | Mod: GC

## 2024-07-12 RX ORDER — ACETAMINOPHEN 325 MG
975 TABLET ORAL ONCE
Refills: 0 | Status: COMPLETED | OUTPATIENT
Start: 2024-07-12 | End: 2024-07-12

## 2024-07-12 RX ADMIN — Medication 975 MILLIGRAM(S): at 23:48

## 2024-07-13 VITALS
OXYGEN SATURATION: 100 % | RESPIRATION RATE: 17 BRPM | DIASTOLIC BLOOD PRESSURE: 73 MMHG | SYSTOLIC BLOOD PRESSURE: 114 MMHG | TEMPERATURE: 98 F | HEART RATE: 67 BPM

## 2024-07-13 LAB
ALBUMIN SERPL ELPH-MCNC: 4.2 G/DL — SIGNIFICANT CHANGE UP (ref 3.3–5)
ALP SERPL-CCNC: 64 U/L — SIGNIFICANT CHANGE UP (ref 40–120)
ALT FLD-CCNC: 14 U/L — SIGNIFICANT CHANGE UP (ref 4–33)
ANION GAP SERPL CALC-SCNC: 12 MMOL/L — SIGNIFICANT CHANGE UP (ref 7–14)
AST SERPL-CCNC: 16 U/L — SIGNIFICANT CHANGE UP (ref 4–32)
BILIRUB SERPL-MCNC: 0.4 MG/DL — SIGNIFICANT CHANGE UP (ref 0.2–1.2)
BLD GP AB SCN SERPL QL: NEGATIVE — SIGNIFICANT CHANGE UP
BUN SERPL-MCNC: 16 MG/DL — SIGNIFICANT CHANGE UP (ref 7–23)
CALCIUM SERPL-MCNC: 9 MG/DL — SIGNIFICANT CHANGE UP (ref 8.4–10.5)
CHLORIDE SERPL-SCNC: 101 MMOL/L — SIGNIFICANT CHANGE UP (ref 98–107)
CO2 SERPL-SCNC: 23 MMOL/L — SIGNIFICANT CHANGE UP (ref 22–31)
CREAT SERPL-MCNC: 0.63 MG/DL — SIGNIFICANT CHANGE UP (ref 0.5–1.3)
EGFR: 119 ML/MIN/1.73M2 — SIGNIFICANT CHANGE UP
GLUCOSE SERPL-MCNC: 93 MG/DL — SIGNIFICANT CHANGE UP (ref 70–99)
HCG SERPL-ACNC: 8.2 MIU/ML — SIGNIFICANT CHANGE UP
HCT VFR BLD CALC: 36.8 % — SIGNIFICANT CHANGE UP (ref 34.5–45)
HGB BLD-MCNC: 13 G/DL — SIGNIFICANT CHANGE UP (ref 11.5–15.5)
MCHC RBC-ENTMCNC: 31.5 PG — SIGNIFICANT CHANGE UP (ref 27–34)
MCHC RBC-ENTMCNC: 35.3 GM/DL — SIGNIFICANT CHANGE UP (ref 32–36)
MCV RBC AUTO: 89.1 FL — SIGNIFICANT CHANGE UP (ref 80–100)
NRBC # BLD: 0 /100 WBCS — SIGNIFICANT CHANGE UP (ref 0–0)
NRBC # FLD: 0 K/UL — SIGNIFICANT CHANGE UP (ref 0–0)
PLATELET # BLD AUTO: 217 K/UL — SIGNIFICANT CHANGE UP (ref 150–400)
POTASSIUM SERPL-MCNC: 3.5 MMOL/L — SIGNIFICANT CHANGE UP (ref 3.5–5.3)
POTASSIUM SERPL-SCNC: 3.5 MMOL/L — SIGNIFICANT CHANGE UP (ref 3.5–5.3)
PROT SERPL-MCNC: 6.9 G/DL — SIGNIFICANT CHANGE UP (ref 6–8.3)
RBC # BLD: 4.13 M/UL — SIGNIFICANT CHANGE UP (ref 3.8–5.2)
RBC # FLD: 11.8 % — SIGNIFICANT CHANGE UP (ref 10.3–14.5)
RH IG SCN BLD-IMP: POSITIVE — SIGNIFICANT CHANGE UP
SODIUM SERPL-SCNC: 136 MMOL/L — SIGNIFICANT CHANGE UP (ref 135–145)
WBC # BLD: 6.46 K/UL — SIGNIFICANT CHANGE UP (ref 3.8–10.5)
WBC # FLD AUTO: 6.46 K/UL — SIGNIFICANT CHANGE UP (ref 3.8–10.5)

## 2024-07-13 PROCEDURE — 76830 TRANSVAGINAL US NON-OB: CPT | Mod: 26

## 2024-07-13 RX ORDER — ONDANSETRON HYDROCHLORIDE 2 MG/ML
4 INJECTION INTRAMUSCULAR; INTRAVENOUS ONCE
Refills: 0 | Status: COMPLETED | OUTPATIENT
Start: 2024-07-13 | End: 2024-07-13

## 2024-07-13 RX ADMIN — ONDANSETRON HYDROCHLORIDE 4 MILLIGRAM(S): 2 INJECTION INTRAMUSCULAR; INTRAVENOUS at 00:13

## 2025-01-27 ENCOUNTER — EMERGENCY (EMERGENCY)
Facility: HOSPITAL | Age: 35
LOS: 0 days | Discharge: HOME | End: 2025-01-27
Attending: EMERGENCY MEDICINE
Payer: COMMERCIAL

## 2025-01-27 VITALS
DIASTOLIC BLOOD PRESSURE: 90 MMHG | HEART RATE: 92 BPM | RESPIRATION RATE: 16 BRPM | SYSTOLIC BLOOD PRESSURE: 127 MMHG | OXYGEN SATURATION: 100 % | TEMPERATURE: 98 F

## 2025-01-27 VITALS — HEIGHT: 61 IN | WEIGHT: 134.92 LBS

## 2025-01-27 DIAGNOSIS — Z98.890 OTHER SPECIFIED POSTPROCEDURAL STATES: ICD-10-CM

## 2025-01-27 DIAGNOSIS — M00.9 PYOGENIC ARTHRITIS, UNSPECIFIED: ICD-10-CM

## 2025-01-27 DIAGNOSIS — Z91.040 LATEX ALLERGY STATUS: ICD-10-CM

## 2025-01-27 DIAGNOSIS — M25.561 PAIN IN RIGHT KNEE: ICD-10-CM

## 2025-01-27 LAB
ALBUMIN SERPL ELPH-MCNC: 3.4 G/DL — SIGNIFICANT CHANGE UP (ref 3.3–5)
ALP SERPL-CCNC: 83 U/L — SIGNIFICANT CHANGE UP (ref 40–120)
ALT FLD-CCNC: 35 U/L — SIGNIFICANT CHANGE UP (ref 12–78)
ANION GAP SERPL CALC-SCNC: 5 MMOL/L — SIGNIFICANT CHANGE UP (ref 5–17)
AST SERPL-CCNC: 18 U/L — SIGNIFICANT CHANGE UP (ref 15–37)
BASOPHILS # BLD AUTO: 0.02 K/UL — SIGNIFICANT CHANGE UP (ref 0–0.2)
BASOPHILS NFR BLD AUTO: 0.3 % — SIGNIFICANT CHANGE UP (ref 0–2)
BILIRUB SERPL-MCNC: 0.4 MG/DL — SIGNIFICANT CHANGE UP (ref 0.2–1.2)
BUN SERPL-MCNC: 11 MG/DL — SIGNIFICANT CHANGE UP (ref 7–23)
CALCIUM SERPL-MCNC: 9.1 MG/DL — SIGNIFICANT CHANGE UP (ref 8.5–10.1)
CHLORIDE SERPL-SCNC: 104 MMOL/L — SIGNIFICANT CHANGE UP (ref 96–108)
CO2 SERPL-SCNC: 29 MMOL/L — SIGNIFICANT CHANGE UP (ref 22–31)
CREAT SERPL-MCNC: 0.63 MG/DL — SIGNIFICANT CHANGE UP (ref 0.5–1.3)
CRP SERPL-MCNC: 11.9 MG/ML — HIGH (ref 0–5)
EGFR: 119 ML/MIN/1.73M2 — SIGNIFICANT CHANGE UP
EOSINOPHIL # BLD AUTO: 0.05 K/UL — SIGNIFICANT CHANGE UP (ref 0–0.5)
EOSINOPHIL NFR BLD AUTO: 0.8 % — SIGNIFICANT CHANGE UP (ref 0–6)
ERYTHROCYTE [SEDIMENTATION RATE] IN BLOOD: 15 MM/HR — SIGNIFICANT CHANGE UP (ref 0–15)
GLUCOSE SERPL-MCNC: 96 MG/DL — SIGNIFICANT CHANGE UP (ref 70–99)
HCT VFR BLD CALC: 40.9 % — SIGNIFICANT CHANGE UP (ref 34.5–45)
HGB BLD-MCNC: 14.1 G/DL — SIGNIFICANT CHANGE UP (ref 11.5–15.5)
IMM GRANULOCYTES NFR BLD AUTO: 0.3 % — SIGNIFICANT CHANGE UP (ref 0–0.9)
LYMPHOCYTES # BLD AUTO: 1.59 K/UL — SIGNIFICANT CHANGE UP (ref 1–3.3)
LYMPHOCYTES # BLD AUTO: 26.2 % — SIGNIFICANT CHANGE UP (ref 13–44)
MCHC RBC-ENTMCNC: 31.8 PG — SIGNIFICANT CHANGE UP (ref 27–34)
MCHC RBC-ENTMCNC: 34.5 G/DL — SIGNIFICANT CHANGE UP (ref 32–36)
MCV RBC AUTO: 92.1 FL — SIGNIFICANT CHANGE UP (ref 80–100)
MONOCYTES # BLD AUTO: 0.35 K/UL — SIGNIFICANT CHANGE UP (ref 0–0.9)
MONOCYTES NFR BLD AUTO: 5.8 % — SIGNIFICANT CHANGE UP (ref 2–14)
NEUTROPHILS # BLD AUTO: 4.05 K/UL — SIGNIFICANT CHANGE UP (ref 1.8–7.4)
NEUTROPHILS NFR BLD AUTO: 66.6 % — SIGNIFICANT CHANGE UP (ref 43–77)
PLATELET # BLD AUTO: 278 K/UL — SIGNIFICANT CHANGE UP (ref 150–400)
POTASSIUM SERPL-MCNC: 3.8 MMOL/L — SIGNIFICANT CHANGE UP (ref 3.5–5.3)
POTASSIUM SERPL-SCNC: 3.8 MMOL/L — SIGNIFICANT CHANGE UP (ref 3.5–5.3)
PROT SERPL-MCNC: 7.5 GM/DL — SIGNIFICANT CHANGE UP (ref 6–8.3)
RBC # BLD: 4.44 M/UL — SIGNIFICANT CHANGE UP (ref 3.8–5.2)
RBC # FLD: 11.9 % — SIGNIFICANT CHANGE UP (ref 10.3–14.5)
SODIUM SERPL-SCNC: 138 MMOL/L — SIGNIFICANT CHANGE UP (ref 135–145)
WBC # BLD: 6.08 K/UL — SIGNIFICANT CHANGE UP (ref 3.8–10.5)
WBC # FLD AUTO: 6.08 K/UL — SIGNIFICANT CHANGE UP (ref 3.8–10.5)

## 2025-01-27 PROCEDURE — 99285 EMERGENCY DEPT VISIT HI MDM: CPT

## 2025-01-27 PROCEDURE — 96374 THER/PROPH/DIAG INJ IV PUSH: CPT

## 2025-01-27 PROCEDURE — 85652 RBC SED RATE AUTOMATED: CPT

## 2025-01-27 PROCEDURE — 80053 COMPREHEN METABOLIC PANEL: CPT

## 2025-01-27 PROCEDURE — 99284 EMERGENCY DEPT VISIT MOD MDM: CPT | Mod: 25

## 2025-01-27 PROCEDURE — 87389 HIV-1 AG W/HIV-1&-2 AB AG IA: CPT

## 2025-01-27 PROCEDURE — 86140 C-REACTIVE PROTEIN: CPT

## 2025-01-27 PROCEDURE — 87040 BLOOD CULTURE FOR BACTERIA: CPT

## 2025-01-27 PROCEDURE — 85025 COMPLETE CBC W/AUTO DIFF WBC: CPT

## 2025-01-27 PROCEDURE — 86803 HEPATITIS C AB TEST: CPT

## 2025-01-27 PROCEDURE — 73562 X-RAY EXAM OF KNEE 3: CPT | Mod: RT

## 2025-01-27 PROCEDURE — 73562 X-RAY EXAM OF KNEE 3: CPT | Mod: 26,RT

## 2025-01-27 PROCEDURE — 36415 COLL VENOUS BLD VENIPUNCTURE: CPT

## 2025-01-27 RX ORDER — CEFAZOLIN SODIUM 1 G
2000 VIAL (EA) INJECTION ONCE
Refills: 0 | Status: COMPLETED | OUTPATIENT
Start: 2025-01-27 | End: 2025-01-27

## 2025-01-27 RX ORDER — IBUPROFEN 200 MG
600 TABLET ORAL ONCE
Refills: 0 | Status: COMPLETED | OUTPATIENT
Start: 2025-01-27 | End: 2025-01-27

## 2025-01-27 RX ADMIN — Medication 600 MILLIGRAM(S): at 20:28

## 2025-01-27 RX ADMIN — Medication 2000 MILLIGRAM(S): at 20:38

## 2025-01-27 NOTE — ED STATDOCS - WR ORDER DATE AND TIME 1
Nutrition Services Progress Note     Referral Diagnosis:    Body mass index (BMI) of 31.0-31.9 in adult [Z68.31]     Start Time:  0930   End Time:   0945     Food and Nutrition Related History:  Oral fluids: 128 oz water/Gatorade Zero/Sobe zero, occasional 1% milk     Meal/Snack pattern:  3 meals/day, minimal snacks     Types of foods/meals:  Patient reports eating vegetables daily.  She has eliminated sweets this month.  She is consuming 2-3 servings of fruit a day.     24 hour food recall: Breakfast: oatmeal with milk.  Lunch: chicken soup, watermelon and cherries.  Supper: steak, baked potato and green beans.       Medications, specify prescription or OTC: reivewed      Knowledge: Pt has basic knowledge of dietary guidelines for weight loss     Participation in government programs: uses food pantries 1-2x/week. Denies any concerns receiving enough food in her household. On the food [employer] program.      Type of physical activity: rummage sales, no purposeful physical activity     Anthropometric Measurements:  Estimated body mass index is 28.39 kg/m² as calculated from the following:    Height as of this encounter: 5' 4\" (1.626 m).    Weight as of this encounter: 75.02 kg   Weight change: 0.36 kg weight loss x 1 month  Personal goal weight: 72 kg     Biochemical Data, Medical Tests, and Procedures:  Labs from 2/8/21-reviewed     Nutrition-Focused Physical Findings:  Overall appearance: obese female  Digestive system (mouth to rectum): edentulous   Extremities, muscles and bones: left leg swelling      Client History:        History - past medical             Past Medical History:   Diagnosis Date   • Anxiety and depression     • Arthritis     • Chronic back pain     • COPD (chronic obstructive pulmonary disease) (CMS/[facility])       saw Dr. Oliver through [facility] 10/13/16, recommended f/u 1 year   • CSA (central sleep apnea) 1/7/2016   • Essential (primary) hypertension     • Facial paralysis/[place] palsy     • Fibromyalgia      • GERD (gastroesophageal reflux disease)     • History of adenomatous polyp of colon     • Incontinence     • Leg edema     • BECCA (obstructive sleep apnea) 1/7/2016     no machine    • Osteopenia       bone density 4/29/16   • Other and unspecified hyperlipidemia     • Restless legs syndrome (RLS)     • Tobacco use            Comparative Standards:   Estimated energy needs -  Total energy estimated needs (CS-1.1.1): 5508-5267 kcal (20-22 kcal/kg), 58-73 g protein (0.8-1 g/kg)      Nutrition Diagnosis:  Overweight/obesity related to history of decreased activity and excessive calorie intakes as evidenced by BMI.     Nutrition Prescription:  Regular Diet  Following My Plate Method     Intervention:  Purpose of the nutrition education:  Reviewed the my plate and importance of eating 3 meals/day with all food groups.  Congratulated patient on the changes she is making.  Reviewed benefits of exercise.    Personal Goals:  1. Continue with fruits and vegetables.  2. Lean protein with all meals and snacks.  3. Follow up month.           The instruction was given to the patient.  The barriers to self-care and learning limitations were assessed as none  Preferences for learning: No preference     Patient has a basic understanding following the plate method/low sodium diet after education  Learning Topics: Rationale for Diet and Guidelines for Diet   Handouts given: AVS     Monitoring and Evaluation:  Area(s) and level of knowledge:  Goal: Pt verbalize understanding of diet education.   Achieved-pt verbalized basic understanding     Weight change: Goal: 0.45 to 0.9 kg of wt loss per week. progressing-patient lost 0.36 kg this month.     Follow up in 1 month.      27-Jan-2025 18:51

## 2025-01-27 NOTE — ED STATDOCS - CLINICAL SUMMARY MEDICAL DECISION MAKING FREE TEXT BOX
Patient evaluated by orthopedics, labs and imaging unremarkable.  Recommending Ancef, discharged home in good condition, recommend close outpatient follow-up with orthopedics tomorrow, to take Bactrim prescription that was sent by nurse practitioner.  Strict return precautions given for any worsening.  Patient verbalized understanding agreed to plan.

## 2025-01-27 NOTE — ED STATDOCS - PHYSICAL EXAMINATION
Physical Exam:  Gen: NAD, non-toxic appearing, able to ambulate without assistance  Head: NCAT  HEENT: EOMI, PEERLA, normal conjunctiva, tongue midline, oral mucosa moist  Lung: CTAB, no respiratory distress, no wheezes/rhonchi/rales B/L, speaking in full sentences  CV: RRR, no murmurs, rubs or gallops, distal pulses 2+ b/l  Abd: soft, nontender, no distention, no guarding, no rigidity, no rebound tenderness  MSK: no visible deformities, ROM normal in UE/LE  Skin: Cellulitis to R knee overlying post operative site, decreased ROM secondary to pain, No wound dehiscence , no discharge from wound, sutures in place   Psych: normal affect, calm

## 2025-01-27 NOTE — ED STATDOCS - PATIENT PORTAL LINK FT
You can access the FollowMyHealth Patient Portal offered by [facility] by registering at the following website: [email or web address]. By joining [facility]’s FollowMyHealth portal, you will also be able to view your health information using other applications (apps) compatible with our system.

## 2025-01-27 NOTE — ED ADULT NURSE NOTE - NSFALLHARMRISKINTERV_ED_ALL_ED

## 2025-01-27 NOTE — CONSULT NOTE ADULT - SUBJECTIVE AND OBJECTIVE BOX
34y Female community ambulatory presents c/o R knee pain for the last 3 days. Patient had a knee arthroscopy of the R knee 7 days ago with Dr Jerry Govea, whom does not operate here.  She was seen in his office today by [facility] nurse practitioner for reddness in her R knee and pain.  She has been doubling up on her pain meds since surgery and is now out.  She was prescribed Anitbiotics and told to follow up tomorrow.  She came to the ED instead.  The surgeon is presently unavailable.  Denies numbness/tingling. Denies fever/chills. Denies pain/injury elsewhere.     HEALTH ISSUES - PROBLEM Dx:        MEDICATIONS  (STANDING):  ceFAZolin  Injectable. 2000 milliGRAM(s) IV Push once    Allergies    No Known Drug Allergies  latex (Rash)    Intolerances                            14.1   6.08  )-----------( 278      ( 27 Jan 2025 19:15 )             40.9     27 Jan 2025 19:15    138    |  104    |  11     ----------------------------<  96     3.8     |  29     |  0.63     Ca    9.1        27 Jan 2025 19:15    TPro  7.5    /  Alb  3.4    /  TBili  0.4    /  DBili  x      /  AST  18     /  ALT  35     /  AlkPhos  83     27 Jan 2025 19:15      Vital Signs Last 24 Hrs  T(C): 37.1 (01-27-25 @ 18:16), Max: 37.1 (01-27-25 @ 18:16)  T(F): 98.7 (01-27-25 @ 18:16), Max: 98.7 (01-27-25 @ 18:16)  HR: 94 (01-27-25 @ 18:16) (94 - 94)  BP: 132/89 (01-27-25 @ 18:16) (132/89 - 132/89)  BP(mean): 99 (01-27-25 @ 18:16) (99 - 99)  RR: 18 (01-27-25 @ 18:16) (18 - 18)  SpO2: 100% (01-27-25 @ 18:16) (100% - 100%)    Imaging: XR demonstates R/L Knee OA    Physical Exam  Gen: NAD  Right LE: skin intact, erythema about the anterior knee. Nothing to suggest sepsis. AROM limited to: 0-60. Mild Effusion. Able to SLR, Neg log roll, Negative Heel strike, no ttp elsewhere, +EHL/FHL/TA/GS function, no calf TTP, DP/PT pulses intact, compartments soft. Calf soft, nontender. Ligamentous exam benign: Varus/Valgus/Lockman Negative. Incision sites dry without drainage       A/P: 34y Female with Right knee pain, s/p arthroscopy  Analgesia  Antiinflammatories as tolerated  WBAT,   Abx as ordered by her Nurse practitioner   ICE/Cryotherapy  DVT PE ppx  FU with operative Orthopedist as outpt tomorrow.    Orthopedically stable for DC  Discussed with Dr. Lainez 34y Female community ambulatory presents c/o R knee pain for the last 3 days. Patient had a knee arthroscopy of the R knee 7 days ago with Dr Jerry Govea, whom does not operate here.  She was seen in his office today by [facility] nurse practitioner for reddness in her R knee and pain.  She has been doubling up on her pain meds since surgery and is now out.  She was prescribed Anitbiotics and told to follow up tomorrow.  She came to the ED instead.  The surgeon is presently unavailable.  Denies numbness/tingling. Denies fever/chills. Denies pain/injury elsewhere.     HEALTH ISSUES - PROBLEM Dx:        MEDICATIONS  (STANDING):  ceFAZolin  Injectable. 2000 milliGRAM(s) IV Push once    Allergies    No Known Drug Allergies  latex (Rash)    Intolerances                            14.1   6.08  )-----------( 278      ( 27 Jan 2025 19:15 )             40.9     27 Jan 2025 19:15    138    |  104    |  11     ----------------------------<  96     3.8     |  29     |  0.63     Ca    9.1        27 Jan 2025 19:15    TPro  7.5    /  Alb  3.4    /  TBili  0.4    /  DBili  x      /  AST  18     /  ALT  35     /  AlkPhos  83     27 Jan 2025 19:15      Vital Signs Last 24 Hrs  T(C): 37.1 (01-27-25 @ 18:16), Max: 37.1 (01-27-25 @ 18:16)  T(F): 98.7 (01-27-25 @ 18:16), Max: 98.7 (01-27-25 @ 18:16)  HR: 94 (01-27-25 @ 18:16) (94 - 94)  BP: 132/89 (01-27-25 @ 18:16) (132/89 - 132/89)  BP(mean): 99 (01-27-25 @ 18:16) (99 - 99)  RR: 18 (01-27-25 @ 18:16) (18 - 18)  SpO2: 100% (01-27-25 @ 18:16) (100% - 100%)      Physical Exam  Gen: NAD  Right LE: skin intact, erythema about the anterior knee. Nothing to suggest sepsis. AROM limited to: 0-60. Mild Effusion. Able to SLR, Neg log roll, Negative Heel strike, no ttp elsewhere, +EHL/FHL/TA/GS function, no calf TTP, DP/PT pulses intact, compartments soft. Calf soft, nontender. Ligamentous exam benign: Varus/Valgus/Lockman Negative. Incision sites dry without drainage       A/P: 34y Female with Right knee pain, s/p arthroscopy  Analgesia  Antiinflammatories as tolerated  WBAT,   Abx as ordered by her Nurse practitioner   ICE/Cryotherapy  DVT PE ppx  FU with operative Orthopedist as outpt tomorrow.    Orthopedically stable for DC  Discussed with Dr. Lainez 34y Female community ambulatory presents c/o R knee pain for the last 3 days. Patient had a knee arthroscopy of the R knee 7 days ago with Dr Jerry Govea, whom does not operate here.  She was seen in his office today by [facility] nurse practitioner for reddness in her R knee and pain.  She has been doubling up on her pain meds since surgery and is now out.  She was prescribed Anitbiotics and told to follow up tomorrow.  She came to the ED instead.  The surgeon is presently unavailable.  Denies numbness/tingling. Denies fever/chills. Denies pain/injury elsewhere.     HEALTH ISSUES - PROBLEM Dx:        MEDICATIONS  (STANDING):  ceFAZolin  Injectable. 2000 milliGRAM(s) IV Push once    Allergies    No Known Drug Allergies  latex (Rash)    Intolerances                            14.1   6.08  )-----------( 278      ( 27 Jan 2025 19:15 )             40.9     27 Jan 2025 19:15    138    |  104    |  11     ----------------------------<  96     3.8     |  29     |  0.63     Ca    9.1        27 Jan 2025 19:15    TPro  7.5    /  Alb  3.4    /  TBili  0.4    /  DBili  x      /  AST  18     /  ALT  35     /  AlkPhos  83     27 Jan 2025 19:15      Vital Signs Last 24 Hrs  T(C): 37.1 (01-27-25 @ 18:16), Max: 37.1 (01-27-25 @ 18:16)  T(F): 98.7 (01-27-25 @ 18:16), Max: 98.7 (01-27-25 @ 18:16)  HR: 94 (01-27-25 @ 18:16) (94 - 94)  BP: 132/89 (01-27-25 @ 18:16) (132/89 - 132/89)  BP(mean): 99 (01-27-25 @ 18:16) (99 - 99)  RR: 18 (01-27-25 @ 18:16) (18 - 18)  SpO2: 100% (01-27-25 @ 18:16) (100% - 100%)      Physical Exam  Gen: NAD  Right LE: skin intact, erythema about the anterior knee. Nothing to suggest sepsis. AROM limited to: 0-60. Mild Effusion. Able to SLR, Neg log roll, Negative Heel strike, no ttp elsewhere, +EHL/FHL/TA/GS function, no calf TTP, DP/PT pulses intact, compartments soft. Calf soft, nontender. Ligamentous exam benign: Varus/Valgus/Lockman Negative. Incision sites dry without drainage       A/P: 34y Female with Right knee pain, s/p arthroscopy  Analgesia  Antiinflammatories as tolerated  WBAT,   Abx as ordered by her Nurse practitioner   ICE/Cryotherapy  DVT PE ppx  We spoke specifically about the signs and symptoms of joint infection, and to please return should these occur. (Pain, fever, malaise   FU with operative Orthopedist as outpt tomorrow.    Orthopedically stable for DC  Discussed with Dr. Lainez

## 2025-01-27 NOTE — ED STATDOCS - NSFOLLOWUPINSTRUCTIONS_ED_ALL_ED_FT
Take motrin 600mg every 6 hours for pain and swelling   Take antibiotic as previously prescribed   weight bearing as tolerated   ICE   Follow up Dr. Mccullough outpatient tomorrow.     Return to the ED if any worsening symptoms.

## 2025-01-27 NOTE — ED ADULT TRIAGE NOTE - CHIEF COMPLAINT QUOTE
Pt sent in by MD for right knee infection s/p knee sx on 1/20/25. Oxy at 1030. Pt A&ox4, ambulatory with crutches, no s/s of distress. Denies any fevers

## 2025-01-27 NOTE — ED STATDOCS - OBJECTIVE STATEMENT
33 y/o F with no PMHx presents to ED SIB  Dr Jose Mccullough for right knee infection and pain s/p  arthroscopic knee surgery done by Dr Jose Mccullough on 1/20/25.

## 2025-01-27 NOTE — ED STATDOCS - PROGRESS NOTE DETAILS
ginger Power: I participated in the care of this patient. I agree with the history, physical and plan. Addison Power: ortho resident called and will see pt at bedside. ORQUIDEA Power: ortho seen pt at bedside.  recommends stable for dc after iv abx, and to follow up with private ortho tomorrow. P to take abx as previously prescribed by NP. pt agrees with plan and stable for dc.

## 2025-01-28 LAB — HIV 1+2 AB+HIV1 P24 AG SERPL QL IA: SIGNIFICANT CHANGE UP

## 2025-01-29 LAB
HCV AB S/CO SERPL IA: 0.13 S/CO — SIGNIFICANT CHANGE UP (ref 0–0.99)
HCV AB SERPL-IMP: SIGNIFICANT CHANGE UP

## 2025-02-02 LAB
CULTURE RESULTS: SIGNIFICANT CHANGE UP
CULTURE RESULTS: SIGNIFICANT CHANGE UP
SPECIMEN SOURCE: SIGNIFICANT CHANGE UP
SPECIMEN SOURCE: SIGNIFICANT CHANGE UP

## 2025-04-04 NOTE — ED PROVIDER NOTE - OBJECTIVE STATEMENT
What Is The Reason For Today's Visit?: Full Body Skin Examination with No Concerns What Is The Reason For Today's Visit? (Being Monitored For X): surveillance against the recurrence of atypical nevi 32 y/o female with no pertinent PMHx  presents to  the ED c/o rectal bleeding since last night. States  she had cramping constant epigastric pain  radiating into back with associated lose BM.  3 episodes of loose BM showed blood this morning.  No diarrhea in ED. No appendectomy or cholecystectomy. No recent travel or sick contacts. +nausea. +subjective fever. +loss of appetite.  + severe back pain. Pain is unaffected by movement. 32 y/o female with no pertinent PMHx  presents to  the ED c/o abd pain with associated D since last night. States she had cramping constant epigastric pain radiating into back with associated mult. episodes loose BMs.  3 episodes of loose BM showed blood this morning.  No diarrhea in ED. No h/o appendectomy or cholecystectomy. No recent travel or known sick contacts. +nausea. +subjective fever. +loss of appetite.  + severe back pain. Pain is unaffected by movement.